# Patient Record
Sex: FEMALE | Race: BLACK OR AFRICAN AMERICAN | URBAN - METROPOLITAN AREA
[De-identification: names, ages, dates, MRNs, and addresses within clinical notes are randomized per-mention and may not be internally consistent; named-entity substitution may affect disease eponyms.]

---

## 2017-08-05 ENCOUNTER — OUTPATIENT (OUTPATIENT)
Dept: EMERGENCY DEPT | Facility: HOSPITAL | Age: 37
LOS: 1 days | Discharge: HOME | End: 2017-08-05
Payer: COMMERCIAL

## 2017-08-05 ENCOUNTER — EMERGENCY (EMERGENCY)
Facility: HOSPITAL | Age: 37
LOS: 1 days | Discharge: OTHER INSTITUTION | End: 2017-08-05
Attending: EMERGENCY MEDICINE | Admitting: EMERGENCY MEDICINE
Payer: COMMERCIAL

## 2017-08-05 VITALS
DIASTOLIC BLOOD PRESSURE: 66 MMHG | WEIGHT: 177.91 LBS | HEIGHT: 66 IN | HEART RATE: 89 BPM | SYSTOLIC BLOOD PRESSURE: 119 MMHG | RESPIRATION RATE: 16 BRPM | TEMPERATURE: 98 F | OXYGEN SATURATION: 97 %

## 2017-08-05 VITALS
TEMPERATURE: 98 F | OXYGEN SATURATION: 99 % | SYSTOLIC BLOOD PRESSURE: 113 MMHG | DIASTOLIC BLOOD PRESSURE: 80 MMHG | HEART RATE: 88 BPM | RESPIRATION RATE: 18 BRPM

## 2017-08-05 VITALS — HEART RATE: 79 BPM | TEMPERATURE: 98 F | DIASTOLIC BLOOD PRESSURE: 75 MMHG | SYSTOLIC BLOOD PRESSURE: 121 MMHG

## 2017-08-05 VITALS
TEMPERATURE: 97 F | OXYGEN SATURATION: 98 % | DIASTOLIC BLOOD PRESSURE: 75 MMHG | SYSTOLIC BLOOD PRESSURE: 113 MMHG | HEART RATE: 89 BPM | RESPIRATION RATE: 18 BRPM

## 2017-08-05 DIAGNOSIS — R55 SYNCOPE AND COLLAPSE: ICD-10-CM

## 2017-08-05 DIAGNOSIS — Z98.890 OTHER SPECIFIED POSTPROCEDURAL STATES: Chronic | ICD-10-CM

## 2017-08-05 DIAGNOSIS — S82.851A DISPLACED TRIMALLEOLAR FRACTURE OF RIGHT LOWER LEG, INITIAL ENCOUNTER FOR CLOSED FRACTURE: ICD-10-CM

## 2017-08-05 DIAGNOSIS — Z79.899 OTHER LONG TERM (CURRENT) DRUG THERAPY: ICD-10-CM

## 2017-08-05 DIAGNOSIS — Z3A.34 34 WEEKS GESTATION OF PREGNANCY: ICD-10-CM

## 2017-08-05 DIAGNOSIS — O9A.213 INJURY, POISONING AND CERTAIN OTHER CONSEQUENCES OF EXTERNAL CAUSES COMPLICATING PREGNANCY, THIRD TRIMESTER: ICD-10-CM

## 2017-08-05 LAB
ALBUMIN SERPL ELPH-MCNC: 2.4 G/DL — LOW (ref 3.4–5)
ALP SERPL-CCNC: 139 U/L — HIGH (ref 40–120)
ALT FLD-CCNC: 14 U/L — SIGNIFICANT CHANGE UP (ref 12–42)
ANION GAP SERPL CALC-SCNC: 10 MMOL/L — SIGNIFICANT CHANGE UP (ref 9–16)
APTT BLD: 32 SEC — SIGNIFICANT CHANGE UP (ref 27.5–36.5)
AST SERPL-CCNC: 14 U/L — LOW (ref 15–37)
BILIRUB SERPL-MCNC: 0.1 MG/DL — LOW (ref 0.2–1.2)
BUN SERPL-MCNC: 10 MG/DL — SIGNIFICANT CHANGE UP (ref 7–23)
CALCIUM SERPL-MCNC: 9.6 MG/DL — SIGNIFICANT CHANGE UP (ref 8.5–10.5)
CHLORIDE SERPL-SCNC: 103 MMOL/L — SIGNIFICANT CHANGE UP (ref 96–108)
CO2 SERPL-SCNC: 23 MMOL/L — SIGNIFICANT CHANGE UP (ref 22–31)
CREAT SERPL-MCNC: 0.68 MG/DL — SIGNIFICANT CHANGE UP (ref 0.5–1.3)
GLUCOSE SERPL-MCNC: 107 MG/DL — HIGH (ref 70–99)
HCT VFR BLD CALC: 34.6 % — SIGNIFICANT CHANGE UP (ref 34.5–45)
HGB BLD-MCNC: 11.4 G/DL — LOW (ref 11.5–15.5)
INR BLD: 0.99 — SIGNIFICANT CHANGE UP (ref 0.88–1.16)
MCHC RBC-ENTMCNC: 27.9 PG — SIGNIFICANT CHANGE UP (ref 27–34)
MCHC RBC-ENTMCNC: 32.9 G/DL — SIGNIFICANT CHANGE UP (ref 32–36)
MCV RBC AUTO: 84.6 FL — SIGNIFICANT CHANGE UP (ref 80–100)
PLATELET # BLD AUTO: 450 K/UL — HIGH (ref 150–400)
POTASSIUM SERPL-MCNC: 3.6 MMOL/L — SIGNIFICANT CHANGE UP (ref 3.5–5.3)
POTASSIUM SERPL-SCNC: 3.6 MMOL/L — SIGNIFICANT CHANGE UP (ref 3.5–5.3)
PROT SERPL-MCNC: 7.2 G/DL — SIGNIFICANT CHANGE UP (ref 6.4–8.2)
PROTHROM AB SERPL-ACNC: 10.9 SEC — SIGNIFICANT CHANGE UP (ref 9.8–12.7)
RBC # BLD: 4.09 M/UL — SIGNIFICANT CHANGE UP (ref 3.8–5.2)
RBC # FLD: 12.7 % — SIGNIFICANT CHANGE UP (ref 10.3–16.9)
SODIUM SERPL-SCNC: 136 MMOL/L — SIGNIFICANT CHANGE UP (ref 132–145)
WBC # BLD: 13.4 K/UL — HIGH (ref 3.8–10.5)
WBC # FLD AUTO: 13.4 K/UL — HIGH (ref 3.8–10.5)

## 2017-08-05 PROCEDURE — 73610 X-RAY EXAM OF ANKLE: CPT | Mod: 26,RT

## 2017-08-05 PROCEDURE — 99285 EMERGENCY DEPT VISIT HI MDM: CPT | Mod: 25

## 2017-08-05 PROCEDURE — 93010 ELECTROCARDIOGRAM REPORT: CPT | Mod: 59

## 2017-08-05 PROCEDURE — 99285 EMERGENCY DEPT VISIT HI MDM: CPT | Mod: 25,GC

## 2017-08-05 PROCEDURE — 73610 X-RAY EXAM OF ANKLE: CPT | Mod: 26,77,RT

## 2017-08-05 PROCEDURE — 73600 X-RAY EXAM OF ANKLE: CPT | Mod: 26,59,RT

## 2017-08-05 PROCEDURE — 27818 TREATMENT OF ANKLE FRACTURE: CPT | Mod: RT

## 2017-08-05 PROCEDURE — 29515 APPLICATION SHORT LEG SPLINT: CPT

## 2017-08-05 PROCEDURE — 73610 X-RAY EXAM OF ANKLE: CPT

## 2017-08-05 RX ORDER — SODIUM CHLORIDE 9 MG/ML
1000 INJECTION INTRAMUSCULAR; INTRAVENOUS; SUBCUTANEOUS ONCE
Qty: 0 | Refills: 0 | Status: COMPLETED | OUTPATIENT
Start: 2017-08-05 | End: 2017-08-05

## 2017-08-05 RX ORDER — HYDROMORPHONE HYDROCHLORIDE 2 MG/ML
0.5 INJECTION INTRAMUSCULAR; INTRAVENOUS; SUBCUTANEOUS ONCE
Qty: 0 | Refills: 0 | Status: DISCONTINUED | OUTPATIENT
Start: 2017-08-05 | End: 2017-08-05

## 2017-08-05 RX ADMIN — HYDROMORPHONE HYDROCHLORIDE 0.5 MILLIGRAM(S): 2 INJECTION INTRAMUSCULAR; INTRAVENOUS; SUBCUTANEOUS at 16:19

## 2017-08-05 RX ADMIN — HYDROMORPHONE HYDROCHLORIDE 0.5 MILLIGRAM(S): 2 INJECTION INTRAMUSCULAR; INTRAVENOUS; SUBCUTANEOUS at 18:37

## 2017-08-05 RX ADMIN — HYDROMORPHONE HYDROCHLORIDE 0.5 MILLIGRAM(S): 2 INJECTION INTRAMUSCULAR; INTRAVENOUS; SUBCUTANEOUS at 16:46

## 2017-08-05 RX ADMIN — SODIUM CHLORIDE 2000 MILLILITER(S): 9 INJECTION INTRAMUSCULAR; INTRAVENOUS; SUBCUTANEOUS at 16:19

## 2017-08-05 NOTE — ED ADULT TRIAGE NOTE - CHIEF COMPLAINT QUOTE
"I passed out today and hurt my right ankle". Pt was transferred from [facility] 34wks pregnant, , had .5mg of Dilaudid x2, ultrasound to check baby, + fracture and was splinted.

## 2017-08-05 NOTE — ED PROVIDER NOTE - OBJECTIVE STATEMENT
36 y/o patient who is 34 weeks pregnancy ( EDC , PMD @ [facility] in [place] [[place]]) was walking down the street and started to feel lightheaded and  sweaty and then the next thing she knew she was almost passing out and fell twisting her RIGHT ankle resulting in immediate pain and deformity.  Patient doesn't think that she actually passed all the way out and did not injury any other part of her body in the fall.  No abdominal pain/tenderness, no abdominal trauma.  NO VB/loss of fluids.  Patient arrives in ED awake, alert, oriented x 3, conversant and in no distress.  Still feeling baby move.  Pain and deformity in ankle but no open wounds.  No numbness, tingling, or loss of function. 38 y/o patient who is 34 weeks pregnancy ( EDC , PMD @ [facility] in [place] [[place]]) was walking down the street and started to feel lightheaded and  sweaty and then the next thing she knew she was almost passing out and fell twisting her RIGHT ankle resulting in immediate pain and deformity.  Patient doesn't think that she actually passed all the way out and did not injury any other part of her body in the fall.  No abdominal pain/tenderness, no abdominal trauma.  NO VB/loss of fluids.  Patient arrives in ED awake, alert, oriented x 3, conversant and in no distress.  Still feeling baby move.  Pain and deformity in ankle but no open wounds.  No numbness, tingling, or loss of function.  No balance problems.  No facial droop.  No focal weakness or numbness.  No chest pain, dyspnea, cough, or leg pain/swelling.  Has been getting PNC with normal pregnancy thus far.

## 2017-08-05 NOTE — ED PROVIDER NOTE - CARE PLAN
Principal Discharge DX:	Ankle fracture, right, closed, initial encounter  Secondary Diagnosis:	34 weeks gestation of pregnancy

## 2017-08-05 NOTE — ED PROVIDER NOTE - PROGRESS NOTE DETAILS
Risks/benefits/alternatives to use of narcotic pain medications in pregnancy discussed.  Patient voices understanding of risks and agrees to use of dilaudid for pain. Case d/w Dr. Tomi Crowder, orthopedics.  Xrays reviewed.  He states patient can be splinted and should  be sent ER-ER to see the ortho residents with Dr. Johnson as attending.  Surgery will eventually be needed after delivery.  Case d/w OB resident Dr. Valera who accepts patient as transfer to L&D for NST, etc.  Patient feels well - no abdominal complaints. Posterior/stirrup splint placed on RIGHT ankle.  Normal pulses, movement, sensation, strength, cap refill before and after splinting.  Case d/w ER MD IRASEMA Neal. Director at [facility] who accepts patient as ER-ER transfer.  Per Dr. Crowder, ortho residents have been notified and will see patient in [facility] ED and then patient will go to L&D for NST/etc.  Patient understands plan.  Post-splinting xrays ordered. Bedside ultrasound reveals good fetal movement and good FHR.

## 2017-08-05 NOTE — ED PROVIDER NOTE - MEDICAL DECISION MAKING DETAILS
36 yo 34 weeks  who presents with tri-malleolar fracture s/p presyncope and fall. Reduced and splinted by orthopedics in the ED. Post-reduction Xray ok. No abdominal trauma. US of abdomen and fetal heart tones reassuring. Will admit to OB for baby check and monitoring.

## 2017-08-05 NOTE — ED ADULT NURSE NOTE - CHIEF COMPLAINT QUOTE
syncopal episode after feeling hot and dizzy resulting in a right ankle injury +deformity to ankle. pt is also 7 months pregnant. denies abdominal pain

## 2017-08-05 NOTE — ED PROVIDER NOTE - PHYSICAL EXAMINATION
Constitutional: Well-appearing. Well-developed. No acute distress.  Head: Normocephalic, atraumatic.  ENMT: Eyes clear, sclera non-injected, conjunctiva non-pallorous. Mucus membranes moist.  Respiratory: Respirations non-labored, symmetric chest expansion, lungs clear to auscultation bilaterally.  Cardiac: RRR, S1/S2 present, no S3/S4,. no murmur, gallop or rub.  Vasc: Warm, well-perfused. 2+ radial, dorsalis pedis and posterior tibial pulses. No lower extremity edema.  Abd: pregnant abdomen. Soft, non-tender. Normoactive bowel sounds.   MSK: exquisitely tender R ankle with swelling  Skin: Color appropriate for stated race. No rash or lesions.  Neuro: Awake, alert and oriented to person, place, year and president. Speech fluent with normal fund of knowledge. PERRL, EOMI.  No focal motor or sensory deficits.  Psych: Normal mood. Normal affect.

## 2017-08-05 NOTE — ED PROVIDER NOTE - ATTENDING CONTRIBUTION TO CARE
Agree with documentation provided by resident -- fx stabilized and casted by ortho, admitted to L&D for tocometry.

## 2017-08-05 NOTE — ED PROVIDER NOTE - PHYSICAL EXAMINATION
RIGHT ankle swollen and painful.  Tender diffusely.  Pulses, movement, sensation, strength all normal.  Good cap refill.  Good movement.  Good sensation.  Normal color.  No proximal pain/tenderness or injury. Homans' negative.

## 2017-08-05 NOTE — PROCEDURE NOTE - NSPOSTCAREGUIDE_GEN_A_CORE
Keep the cast/splint/dressing clean and dry/Verbal/written post procedure instructions were given to patient/caregiver/Elevate the injured extremity as instructed

## 2017-08-05 NOTE — CONSULT NOTE ADULT - SUBJECTIVE AND OBJECTIVE BOX
37F no PMH, 34 weeks pregnant, presenting to [facility] w/ R ankle pain s/p syncopal episode. Pt. said she was walking w/ her boyfriend when she felt lightheaded and eventually passed out. She woke up and had R ankle pain and noticed a deformity. EMS splinted and manipulated ankle en route to [facility]. XR at [facility] showed a R trimal fx w/ a reduced mortise.  Pt. transferred to [facility] for admission to OB service. Ortho consulted for further fx management.    Vital Signs Last 24 Hrs  T(C): 36.7 (05 Aug 2017 19:29), Max: 36.8 (05 Aug 2017 18:37)  T(F): 98 (05 Aug 2017 19:29), Max: 98.2 (05 Aug 2017 18:37)  HR: 89 (05 Aug 2017 19:29) (79 - 89)  BP: 119/66 (05 Aug 2017 19:29) (113/80 - 121/75)  BP(mean): --  RR: 16 (05 Aug 2017 19:29) (16 - 18)  SpO2: 97% (05 Aug 2017 19:29) (97% - 99%)    PE:  NAD, laying comfortably in stretcher  R ankle: +deformity noted, c/w ankle dislocation on post-splint XR from [facility], minimal swelling, no skin tenting, diffusely TTP  Motor: +wiggled toes, unable to ROM ankle 2/2 pain  Sensory: SILT  Pulses: wwp, DP/PT 2+, CR <2s    A/P: 37F 34 weeks pregnant w/ R trimal fx    -Transferred from [facility] to OB service for further management  -R ankle closed reduced and splinted in ED  -NWB RLE  -Pain control  -management per primary team  -Can f/u with Dr. Johnson upon discharge  -Dr. Johnson aware 37F no PMH, 34 weeks pregnant, presenting to [facility] w/ R ankle pain s/p syncopal episode. Pt. said she was walking w/ her boyfriend when she felt lightheaded and eventually passed out. She woke up and had R ankle pain and noticed a deformity. EMS splinted and manipulated ankle en route to [facility]. XR at [facility] showed a R trimal fx w/ a reduced mortise.  Pt. transferred to [facility] for admission to OB service. Ortho consulted for further fx management.    Vital Signs Last 24 Hrs  T(C): 36.7 (05 Aug 2017 19:29), Max: 36.8 (05 Aug 2017 18:37)  T(F): 98 (05 Aug 2017 19:29), Max: 98.2 (05 Aug 2017 18:37)  HR: 89 (05 Aug 2017 19:29) (79 - 89)  BP: 119/66 (05 Aug 2017 19:29) (113/80 - 121/75)  BP(mean): --  RR: 16 (05 Aug 2017 19:29) (16 - 18)  SpO2: 97% (05 Aug 2017 19:29) (97% - 99%)    PE:  NAD, laying comfortably in stretcher  R ankle: +deformity noted, c/w ankle dislocation on post-splint XR from [facility], minimal swelling, no skin tenting, diffusely TTP  Motor: +wiggled toes, unable to ROM ankle 2/2 pain  Sensory: SILT  Pulses: wwp, DP/PT 2+, CR <2s    A/P: 37F 34 weeks pregnant w/ R trimal fx    -Transferred from [facility] to OB service for further management  -R ankle closed reduced and splinted in ED  -NWB RLE  -Elevation, Pain control  -management per primary team  -Consider ORIF as outpatient w/ OB clearance and monitoring in 10-14 days to get closer to term  -OK to DC home once cleared by OB, could benefit from a wheelchair upon dc  -Can f/u with Dr. Johnson upon discharge  -Case discussed with Dr. Johnson 37F no PMH, 34 weeks pregnant, presenting to [facility] w/ R ankle pain s/p syncopal episode. Pt. said she was walking w/ her boyfriend when she felt lightheaded and eventually passed out. She woke up and had R ankle pain and noticed a deformity. EMS splinted and manipulated ankle en route to [facility]. XR at [facility] showed a R trimal fx w/ a reduced mortise.  Pt. transferred to [facility] for admission to OB service. Ortho consulted for further fx management.    Vital Signs Last 24 Hrs  T(C): 36.7 (05 Aug 2017 19:29), Max: 36.8 (05 Aug 2017 18:37)  T(F): 98 (05 Aug 2017 19:29), Max: 98.2 (05 Aug 2017 18:37)  HR: 89 (05 Aug 2017 19:29) (79 - 89)  BP: 119/66 (05 Aug 2017 19:29) (113/80 - 121/75)  BP(mean): --  RR: 16 (05 Aug 2017 19:29) (16 - 18)  SpO2: 97% (05 Aug 2017 19:29) (97% - 99%)    PE:  NAD, laying comfortably in stretcher  R ankle: +deformity noted, c/w ankle dislocation on post-splint XR from [facility], minimal swelling, no skin tenting, diffusely TTP  Motor: +wiggled toes, unable to ROM ankle 2/2 pain  Sensory: SILT  Pulses: wwp, DP/PT 2+, CR <2s    A/P: 37F 34 weeks pregnant w/ R trimal fx    -Transferred from [facility] to OB service for further management  -R ankle closed reduced and splinted in ED  -NWB RLE  -Elevation, Pain control  -management per primary team  -Consider ORIF as outpatient w/ OB clearance and monitoring in 10-14 days to get closer to term  -OK to DC home once cleared by OB, could benefit from a wheelchair upon dc  -Can f/u with Dr. Johnson upon discharge ([phone])  -Case discussed with Dr. Johnson

## 2017-08-05 NOTE — ED PROVIDER NOTE - OBJECTIVE STATEMENT
36 y/o patient who is 34 weeks pregnancy ( EDC ) transferred to [facility] ED from [facility] after evaluation of syncope and fall in which pt became lightheaded while walking, tripped and fell, twisting R ankle sustaining a tri-mal fx to the R ankle, splinted at [facility]. 36 y/o patient who is 34 weeks pregnancy ( EDC ) transferred to [facility] ED from [facility] after evaluation of pre-syncope and fall in which pt became lightheaded while walking, twisting R ankle sustaining a tri-mal fx to the R ankle, splinted at [facility]. Denies numbness/tingling of ext. Did not hit head, no LOC. Denies abd trauma. No abd pain, feels strong fetal movement both after impact and at time of interview. No lab abnormalities elicited on bloodwork at [facility], sent for ortho and obgyn eval. VSS at [facility] and en route to [facility].

## 2017-08-05 NOTE — ED ADULT TRIAGE NOTE - CHIEF COMPLAINT QUOTE
syncopal episode after feeling hot and dizzy resulting in a right ankle injury +deformity to ankle. pt is also 7 months pregnant syncopal episode after feeling hot and dizzy resulting in a right ankle injury +deformity to ankle. pt is also 7 months pregnant. denies abdominal pain

## 2017-08-05 NOTE — ED ADULT NURSE REASSESSMENT NOTE - NS ED NURSE REASSESS COMMENT FT1
pt aaox3, resting in stretcher. Pt states she has no complaints at this time. +fetal movement. Ortho at bedside. Will continue to monitor.
Pt splint on right leg assessed by orthopedic MD, Lidocaine 1% pulled for ankle stabilization.

## 2017-08-05 NOTE — ED ADULT NURSE NOTE - CHIEF COMPLAINT QUOTE
"I passed out today and hurt my right ankle". Pt was transferred from [facility] 34wks pregnant, , had .5mg of Dilaudid x2, ultrasound to check baby, + fracture and was splinted.

## 2017-08-05 NOTE — ED PROCEDURE NOTE - NS ED PERI VASCULAR NEG
no cyanosis of extremity/no swelling/capillary refill time < 2 seconds/fingers/toes warm to touch/no paresthesia

## 2017-08-05 NOTE — ED PROVIDER NOTE - MEDICAL DECISION MAKING DETAILS
Patient with near-syncope, twist and closed fracture of RIGHT ankle.  No abdominal injury or complaints.  Will obtain xray, check FHR/movement, labs, fluids, likely transfer [facility] for ortho/L&D

## 2017-08-05 NOTE — ED PROVIDER NOTE - CARE PLAN
Principal Discharge DX:	Fracture of malleolus, right, closed, initial encounter  Secondary Diagnosis:	Fall, initial encounter

## 2017-08-09 DIAGNOSIS — O26.899 OTHER SPECIFIED PREGNANCY RELATED CONDITIONS, UNSPECIFIED TRIMESTER: ICD-10-CM

## 2017-08-09 DIAGNOSIS — Z3A.00 WEEKS OF GESTATION OF PREGNANCY NOT SPECIFIED: ICD-10-CM

## 2021-03-15 ENCOUNTER — OFFICE VISIT (OUTPATIENT)
Dept: OBGYN CLINIC | Facility: CLINIC | Age: 41
End: 2021-03-15
Payer: OTHER MISCELLANEOUS

## 2021-03-15 VITALS
HEART RATE: 74 BPM | DIASTOLIC BLOOD PRESSURE: 72 MMHG | SYSTOLIC BLOOD PRESSURE: 113 MMHG | WEIGHT: 142 LBS | BODY MASS INDEX: 22.82 KG/M2 | HEIGHT: 66 IN

## 2021-03-15 DIAGNOSIS — M54.50 ACUTE BILATERAL LOW BACK PAIN WITHOUT SCIATICA: Primary | ICD-10-CM

## 2021-03-15 PROCEDURE — 99204 OFFICE O/P NEW MOD 45 MIN: CPT | Performed by: PHYSICAL MEDICINE & REHABILITATION

## 2021-03-15 RX ORDER — LIDOCAINE 50 MG/G
1 PATCH TOPICAL DAILY
Qty: 30 PATCH | Refills: 0 | Status: SHIPPED | OUTPATIENT
Start: 2021-03-15 | End: 2021-04-09

## 2021-03-15 RX ORDER — MELOXICAM 7.5 MG/1
7.5 TABLET ORAL DAILY PRN
Qty: 60 TABLET | Refills: 0 | Status: SHIPPED | OUTPATIENT
Start: 2021-03-15 | End: 2021-06-07

## 2021-03-15 NOTE — PATIENT INSTRUCTIONS
Warm soaks with epsom salt can also help with muscle tightness/cramping  Epsom salt releases magnesium which can be helpful  Over-the-counter salonpas patches can be applied to the area of discomfort to help with pain  There are two types of patches; one contains lidocaine 4% and the other contains menthol, camphor and methyl salicylate  You can try one or the other and see which one works best for you  You will be starting physical therapy  It is important to do home exercises as given by your physical therapist as you go through PT to speed up your recovery  Physical therapy addresses the problems that are causing your pain, instead of covering them up, as some other treatment options do  We will see you back after completing physical therapy

## 2021-03-15 NOTE — LETTER
To Whom It May Concern,    Luis Whitten is under my professional care  She was seen in my office on March 15, 2021  She is cleared to return to work with the following restrictions:     No lifting/pushing/pulling/carrying more than 10 lbs  Please excuse Luis Whitten from any work missed on this appointment date  If you have any questions or concerns, please do not hesitate to call          Sincerely,          Guillermo Montano, DO

## 2021-03-15 NOTE — PROGRESS NOTES
1  Acute bilateral low back pain without sciatica  Ambulatory referral to Physical Therapy    meloxicam (MOBIC) 7 5 mg tablet    lidocaine (LIDODERM) 5 %     Orders Placed This Encounter   Procedures    Ambulatory referral to Physical Therapy        Impression:  The patient's pain is multifactorial and is predominantly due to myofascial spasticity/strain and postural/core instability  There are no concerning neurological deficits  We discussed different treatment options and decided to proceed with meloxicam and lidocaine patches  We discussed the risk of NSAID use including internal bleeding, increased blood pressure, increased risk of heart attack/stroke and worsening kidney function  Tylenol (acetaminophen) can be used with one of the NSAIDs or by itself, as long as no new liver problems and not drinking alcohol  The patient should start physical therapy as soon as possible  I will see them back after 3 weeks of physical therapy or earlier if symptoms worsen/change  Patient works for [employer] and she will return to work with a weight restriction  Return in about 3 weeks (around 4/5/2021)  or earlier if symptoms worsen/change  HPI:  Jose House is a 47 y o  female  who presents for evaluation of   Chief Complaint   Patient presents with    Lower Back - Pain       Onset/Mechanism: 1/29/2021- she was working as a  and she developed pain in the middle of her low back  She continued to work and the pain spread laterally and up her spine  [facility] and was told she pulled a muscle in her spine  She was given steroids which helped with her pain but caused her to pee a lot  Location: As above  Radiation: As above  Quality: Aching  Provocative: Standing for too long  Severity: Depends on what she is doing  Associated Symptoms: As above  Also reports shooting numbness in the lower extremities    Treatment so far: Job modifications, steroids, [facility] and was given shots in the back  Symptoms are worse after all of this  Review of Systems   Constitutional: Positive for activity change  Negative for fever  HENT: Negative for trouble swallowing  Eyes: Negative for visual disturbance  Respiratory: Negative for shortness of breath  Cardiovascular: Negative for chest pain  Gastrointestinal: Negative for abdominal pain  Denies bowel incontinence  Endocrine: Negative for polydipsia  Genitourinary:        Denies urinary incontinence  Musculoskeletal: Positive for back pain  Negative for gait problem  Skin: Negative for rash  Allergic/Immunologic: Negative for immunocompromised state  Neurological: Negative for weakness and numbness  Denies saddle anesthesia  Hematological: Does not bruise/bleed easily  Psychiatric/Behavioral: Negative for confusion  No red flag symptoms including fever/chills, unintentional weight change, bowel/bladder incontinence, scissoring gait, personal/family history of cancer, pain worse at night, intravenous drug abuse or trauma  Following history reviewed and updated:  History reviewed  No pertinent past medical history  History reviewed  No pertinent surgical history  Social History   Social History     Substance and Sexual Activity   Alcohol Use None     Social History     Substance and Sexual Activity   Drug Use Not on file     Social History     Tobacco Use   Smoking Status Never Smoker   Smokeless Tobacco Never Used     History reviewed  No pertinent family history  No Known Allergies     Constitutional:  /72   Pulse 74   Ht 5' 6" (1 676 m)   Wt 64 4 kg (142 lb)   BMI 22 92 kg/m²    General: NAD  Eyes: Anicteric sclerae  Neck: Supple  Lungs: Unlabored breathing  Cardiovascular: No lower extremity edema  Skin: Intact without erythema  Neurologic: Sensation intact to light touch  Psychiatric: Mood and affect are appropriate      Orthopedic Examination   Inspection: No obvious deformities, lesions or rashes   ROM: Limited with flexion and extension due to spasticity and pain   Palpation: Tender along the bilateral lumbar paraspinals  There are no step offs   Neuro: Bilateral extremity strength is normal and symmetric  No muscle atrophy or abnormal tone  Bilateral extremity muscle stretch reflexes are physiologic and symmetric   No myelopathic signs  Sensation to light touch is intact throughout  Neural compression testing: Normal bilateral SLR/dural stretch  Equivocal Santillan's maneuver  Gait is normal     Procedures    Imaging Studies (I personally reviewed images in PACS and report if it was available):  Lumbar spine x-rays that are most recent to this encounter were reviewed  These images show a slight curvature of the spine  The L1 transverse processes are elongated and the left L1 transverse process makes contact with the 12th rib  There are no acute characteristics of this anomaly  The right L5 pedicle appears sclerotic on AP view  No acute osseous abnormalities  No severe degeneration

## 2021-04-09 ENCOUNTER — OFFICE VISIT (OUTPATIENT)
Dept: OBGYN CLINIC | Facility: CLINIC | Age: 41
End: 2021-04-09
Payer: OTHER MISCELLANEOUS

## 2021-04-09 VITALS
SYSTOLIC BLOOD PRESSURE: 110 MMHG | BODY MASS INDEX: 22.18 KG/M2 | WEIGHT: 138 LBS | HEIGHT: 66 IN | HEART RATE: 73 BPM | DIASTOLIC BLOOD PRESSURE: 76 MMHG

## 2021-04-09 DIAGNOSIS — M54.50 ACUTE BILATERAL LOW BACK PAIN WITHOUT SCIATICA: Primary | ICD-10-CM

## 2021-04-09 PROCEDURE — 99214 OFFICE O/P EST MOD 30 MIN: CPT | Performed by: PHYSICAL MEDICINE & REHABILITATION

## 2021-04-09 RX ORDER — METHOCARBAMOL 500 MG/1
500 TABLET, FILM COATED ORAL 2 TIMES DAILY PRN
Qty: 30 TABLET | Refills: 0 | Status: SHIPPED | OUTPATIENT
Start: 2021-04-09 | End: 2021-06-07

## 2021-04-09 RX ORDER — METHYLPREDNISOLONE 4 MG/1
TABLET ORAL
Qty: 1 EACH | Refills: 0 | Status: SHIPPED | OUTPATIENT
Start: 2021-04-09 | End: 2021-06-07

## 2021-04-09 NOTE — PROGRESS NOTES
1  Acute bilateral low back pain without sciatica  methylPREDNISolone 4 MG tablet therapy pack    methocarbamol (ROBAXIN) 500 mg tablet     No orders of the defined types were placed in this encounter  Impression:  The patient's pain is multifactorial and is predominantly due to myofascial spasticity/strain and postural/core instability  There are no concerning neurological deficits      We discussed different treatment options and decided to proceed with meloxicam and lidocaine patches on her initial visit along with physical therapy  Patient continues to have pain and reports no improvement in her symptoms  We will have her continue physical therapy  I have prescribed her Medrol dosepak along with methocarbamol  I will see her back in three weeks to reassess  If no improvement, can consider an MRI of her lumbar spine      I will see them back after 3 weeks of physical therapy or earlier if symptoms worsen/change      Patient works for [employer] and she will return to work with a weight restriction  Imaging Studies (I personally reviewed images in PACS and report):  Lumbar spine x-rays that are most recent to this encounter were reviewed  These images show a slight curvature of the spine  The L1 transverse processes are elongated and the left L1 transverse process makes contact with the 12th rib  There are no acute characteristics of this anomaly  The right L5 pedicle appears sclerotic on AP view  No acute osseous abnormalities  No severe degeneration  Return in about 3 weeks (around 4/30/2021)  HPI:  Charly Rebolledo is a 47 y o  female  who presents in follow up  Here for   Chief Complaint   Patient presents with    Lower Back - Follow-up, Pain       Date of injury: 1/29/2021- she was working as a  and she developed pain in the middle of her low back  She continued to work and the pain spread laterally and up her spine    [facility] and was told she pulled a muscle in her spine  She was given steroids which helped with her pain but caused her to pee a lot  Trajectory of symptoms: No improvement since last visit  Review of Systems   Constitutional: Positive for activity change  Negative for fever  HENT: Negative for trouble swallowing  Eyes: Negative for visual disturbance  Respiratory: Negative for shortness of breath  Cardiovascular: Negative for chest pain  Gastrointestinal: Negative for abdominal pain  Denies bowel incontinence  Endocrine: Negative for polydipsia  Genitourinary:        Denies urinary incontinence  Musculoskeletal: Positive for back pain  Negative for gait problem  Skin: Negative for rash  Allergic/Immunologic: Negative for immunocompromised state  Neurological: Negative for weakness and numbness  Denies saddle anesthesia  Hematological: Does not bruise/bleed easily  Psychiatric/Behavioral: Negative for confusion  Following history reviewed and updated:  History reviewed  No pertinent past medical history  History reviewed  No pertinent surgical history  Social History   Social History     Substance and Sexual Activity   Alcohol Use None     Social History     Substance and Sexual Activity   Drug Use Not on file     Social History     Tobacco Use   Smoking Status Never Smoker   Smokeless Tobacco Never Used     History reviewed  No pertinent family history  No Known Allergies     Constitutional:  /76   Pulse 73   Ht 5' 6" (1 676 m)   Wt 62 6 kg (138 lb)   BMI 22 27 kg/m²    General: NAD  Eyes: Clear sclerae  ENT: No inflammation, lesion, or mass of lips  No tracheal deviation  Musculoskeletal: As mentioned below  Integumentary: No visible rashes or skin lesions  Pulmonary/Chest: Effort normal  No respiratory distress  Neuro: CN's grossly intact, EUGENE  Psych: Normal affect and judgement  Vascular: WWP      Back Exam     Tenderness   The patient is experiencing tenderness in the thoracic and lumbar  Range of Motion   Extension: normal   Flexion: abnormal     Muscle Strength   The patient has normal back strength  Other   Sensation: normal  Gait: normal   Erythema: no back redness  Scars: absent    Comments:  No neurological deficits               Procedures

## 2021-04-09 NOTE — LETTER
To Whom It May Concern,    Charly Rebolledo is under my professional care  She was seen in my office on April 9, 2021  She is cleared to return to work with the following restrictions:    · No lifting/pushing/pulling/carrying more than 10 lbs  Please excuse Charly Rebolledo from any work missed on this appointment date  If you have any questions or concerns, please do not hesitate to call          Sincerely,          Guillermo oMntano, DO

## 2021-04-30 ENCOUNTER — OFFICE VISIT (OUTPATIENT)
Dept: OBGYN CLINIC | Facility: CLINIC | Age: 41
End: 2021-04-30
Payer: OTHER MISCELLANEOUS

## 2021-04-30 VITALS
DIASTOLIC BLOOD PRESSURE: 72 MMHG | HEIGHT: 66 IN | WEIGHT: 145 LBS | HEART RATE: 72 BPM | SYSTOLIC BLOOD PRESSURE: 113 MMHG | BODY MASS INDEX: 23.3 KG/M2

## 2021-04-30 DIAGNOSIS — G89.29 CHRONIC RIGHT-SIDED LOW BACK PAIN WITHOUT SCIATICA: Primary | ICD-10-CM

## 2021-04-30 DIAGNOSIS — S39.012D STRAIN OF LUMBAR REGION, SUBSEQUENT ENCOUNTER: ICD-10-CM

## 2021-04-30 DIAGNOSIS — M54.50 CHRONIC RIGHT-SIDED LOW BACK PAIN WITHOUT SCIATICA: Primary | ICD-10-CM

## 2021-04-30 PROCEDURE — 99213 OFFICE O/P EST LOW 20 MIN: CPT | Performed by: PHYSICAL MEDICINE & REHABILITATION

## 2021-04-30 NOTE — PROGRESS NOTES
1  Chronic right-sided low back pain without sciatica  MRI lumbar spine wo contrast   2  Strain of lumbar region, subsequent encounter       Orders Placed This Encounter   Procedures    MRI lumbar spine wo contrast        Impression:  The patient's pain is multifactorial and is predominantly due to myofascial spasticity/strain (quadratus lumborum) and postural/core instability   There are no concerning neurological deficits  Patient presents after going through many weeks of physical therapy  She has also been using meloxicam, lidocaine patches, over-the-counter anti-inflammatories, Tylenol, steroid burst and muscle relaxants which have not helped for more than a few days  At this point, an MRI of her lumbar spine is warranted  The patient works for [employer] and we will have her continue to work with a weight restriction  The patient's symptoms are quite flared up today and I provided her with a note that she will be off for tomorrow to allow her a chance to recover  She will start back up this upcoming Monday with a weight restriction  I will see her back after the MRI  Imaging Studies (I personally reviewed images in PACS and report):  Lumbar spine x-rays that are most recent to this encounter were reviewed  Virgil Bhagat images show a slight curvature of the spine   The L1 transverse processes are elongated and the left L1 transverse process makes contact with the 12th rib   There are no acute characteristics of this anomaly   The right L5 pedicle appears sclerotic on AP view   No acute osseous abnormalities   No severe degeneration  No follow-ups on file  HPI:  Magalie Soni is a 46 y o  female  who presents in follow up  Here for No chief complaint on file  Date of injury: 1/29/2021- she was working as a  and she developed pain in the middle of her low back   She continued to work and the pain spread laterally and up her spine   [facility] she [facility] a [facility] in her spine   She was given steroids which helped with her pain but caused her to pee a lot  Trajectory of symptoms: Does well right after PT but symptoms return soon after  Review of Systems   Constitutional: Positive for activity change  Negative for fever  HENT: Negative for trouble swallowing  Eyes: Negative for visual disturbance  Respiratory: Negative for shortness of breath  Cardiovascular: Negative for chest pain  Gastrointestinal: Negative for abdominal pain  Denies bowel incontinence  Endocrine: Negative for polydipsia  Genitourinary:        Denies urinary incontinence  Musculoskeletal: Positive for back pain  Negative for gait problem  Skin: Negative for rash  Allergic/Immunologic: Negative for immunocompromised state  Neurological: Negative for weakness and numbness  Denies saddle anesthesia  Hematological: Does not bruise/bleed easily  Psychiatric/Behavioral: Negative for confusion  Following history reviewed and updated:  History reviewed  No pertinent past medical history  History reviewed  No pertinent surgical history  Social History   Social History     Substance and Sexual Activity   Alcohol Use None     Social History     Substance and Sexual Activity   Drug Use Not on file     Social History     Tobacco Use   Smoking Status Never Smoker   Smokeless Tobacco Never Used     History reviewed  No pertinent family history  No Known Allergies     Constitutional:  /72   Pulse 72   Ht 5' 6" (1 676 m)   Wt 65 8 kg (145 lb)   BMI 23 40 kg/m²    General: NAD  Eyes: Clear sclerae  ENT: No inflammation, lesion, or mass of lips  No tracheal deviation  Musculoskeletal: As mentioned below  Integumentary: No visible rashes or skin lesions  Pulmonary/Chest: Effort normal  No respiratory distress  Neuro: CN's grossly intact, EUGENE  Psych: Normal affect and judgement  Vascular: WWP      Back Exam     Tenderness   Back tenderness location: TTP along the right QL region  Range of Motion   Extension: abnormal   Flexion: abnormal     Muscle Strength   The patient has normal back strength  Other   Sensation: normal  Gait: normal   Erythema: no back redness  Scars: absent    Comments:  Pain with QL stretches               Procedures

## 2021-04-30 NOTE — LETTER
To Whom It May Concern,    Amrita Childers is under my professional care  She was seen in my office on April 30, 2021  She is cleared to return to work with the following restrictions:     10 lbs weight limit   Excuse her for work missed on 5/1/2021  Please excuse Amrita Childers from any work missed on this appointment date  If you have any questions or concerns, please do not hesitate to call          Sincerely,          Guillermo Montano, DO 0 = independent

## 2021-05-27 ENCOUNTER — TELEPHONE (OUTPATIENT)
Dept: OBGYN CLINIC | Facility: CLINIC | Age: 41
End: 2021-05-27

## 2021-05-27 NOTE — TELEPHONE ENCOUNTER
[facility] called in requesting a new PT script         Please fax PT script to #[phone]

## 2021-06-01 PROBLEM — S39.012A STRAIN OF LUMBAR REGION: Status: ACTIVE | Noted: 2021-06-01

## 2021-06-08 ENCOUNTER — TELEPHONE (OUTPATIENT)
Dept: OBGYN CLINIC | Facility: HOSPITAL | Age: 41
End: 2021-06-08

## 2021-06-08 DIAGNOSIS — S39.012D STRAIN OF LUMBAR REGION, SUBSEQUENT ENCOUNTER: Primary | ICD-10-CM

## 2021-06-08 DIAGNOSIS — M54.50 CHRONIC RIGHT-SIDED LOW BACK PAIN WITHOUT SCIATICA: ICD-10-CM

## 2021-06-08 DIAGNOSIS — G89.29 CHRONIC RIGHT-SIDED LOW BACK PAIN WITHOUT SCIATICA: ICD-10-CM

## 2021-06-08 NOTE — TELEPHONE ENCOUNTER
Josephine from [facility] is calling for a new PT script  The script she was sent is from March and she needs it to be updated starting 6/1/21 if possible  If we could please get this faxed to [phone]  Any questions please contact her at [phone] option #4

## 2021-06-10 ENCOUNTER — TELEPHONE (OUTPATIENT)
Dept: OBGYN CLINIC | Facility: CLINIC | Age: 41
End: 2021-06-10

## 2021-06-11 ENCOUNTER — OFFICE VISIT (OUTPATIENT)
Dept: OBGYN CLINIC | Facility: CLINIC | Age: 41
End: 2021-06-11
Payer: OTHER MISCELLANEOUS

## 2021-06-11 VITALS
HEART RATE: 62 BPM | WEIGHT: 145 LBS | SYSTOLIC BLOOD PRESSURE: 109 MMHG | DIASTOLIC BLOOD PRESSURE: 73 MMHG | HEIGHT: 66 IN | BODY MASS INDEX: 23.3 KG/M2

## 2021-06-11 DIAGNOSIS — M54.50 ACUTE BILATERAL LOW BACK PAIN WITHOUT SCIATICA: ICD-10-CM

## 2021-06-11 DIAGNOSIS — S39.012D STRAIN OF LUMBAR REGION, SUBSEQUENT ENCOUNTER: Primary | ICD-10-CM

## 2021-06-11 PROCEDURE — 99213 OFFICE O/P EST LOW 20 MIN: CPT | Performed by: PHYSICAL MEDICINE & REHABILITATION

## 2021-06-11 NOTE — PROGRESS NOTES
1  Strain of lumbar region, subsequent encounter     2  Acute bilateral low back pain without sciatica       No orders of the defined types were placed in this encounter  Impression:  The patient's pain is multifactorial and is predominantly due to myofascial spasticity/strain (quadratus lumborum) and postural/core instability   There are no concerning neurological deficits      Patient presents after going through many weeks of physical therapy  She has also been using meloxicam, lidocaine patches, over-the-counter anti-inflammatories, Tylenol, steroid burst and muscle relaxants which have not helped for more than a few days  I had ordered an MRI of her lumbar spine on her last visit  Due to insurance/worker's compensation issues, this has not been completed yet      The patient works for [employer] and we will have her continue to work with a weight restriction  If her MRI is within normal limits, the goal will be to get her back to work without any restrictions  Continue with meloxicam   She has had no side effects  I will see her back after the MRI  Imaging Studies (I personally reviewed images in PACS and report):  Lumbar spine x-rays that are most recent to this encounter were reviewed  Hulan Aus images show a slight curvature of the spine   The L1 transverse processes are elongated and the left L1 transverse process makes contact with the 12th rib   There are no acute characteristics of this anomaly   The right L5 pedicle appears sclerotic on AP view   No acute osseous abnormalities   No severe degeneration  Return in about 4 weeks (around 7/9/2021)  HPI:  Lisa Ash is a 46 y o  female  who presents in follow up  Here for   Chief Complaint   Patient presents with    Lower Back - Pain, Spasms       Date of injury: 1/29/2021- she was working as a  and she developed pain in the middle of her low back  She continued to work and the pain spread laterally and up her spine   [facility] she [facility] a muscle in her spine   She was given steroids which helped with her pain but caused her to pee a lot  Trajectory of symptoms:  Patient reports that she has some swelling on the right side of her low back  This has improved after doing certain exercises with physical therapy  She continues to feel that her back is stiff and has trouble with extension  Flexion helps with her back pain  She continues to work with a weight limit restriction  Review of Systems   Constitutional: Positive for activity change  Negative for fever  HENT: Negative for trouble swallowing  Eyes: Negative for visual disturbance  Respiratory: Negative for shortness of breath  Cardiovascular: Negative for chest pain  Gastrointestinal: Negative for abdominal pain  Denies bowel incontinence  Endocrine: Negative for polydipsia  Genitourinary:        Denies urinary incontinence  Musculoskeletal: Positive for back pain  Negative for gait problem  Skin: Negative for rash  Allergic/Immunologic: Negative for immunocompromised state  Neurological: Negative for weakness and numbness  Denies saddle anesthesia  Hematological: Does not bruise/bleed easily  Psychiatric/Behavioral: Negative for confusion  Following history reviewed and updated:  History reviewed  No pertinent past medical history  History reviewed  No pertinent surgical history  Social History   Social History     Substance and Sexual Activity   Alcohol Use None     Social History     Substance and Sexual Activity   Drug Use Not on file     Social History     Tobacco Use   Smoking Status Never Smoker   Smokeless Tobacco Never Used     History reviewed  No pertinent family history  No Known Allergies     Constitutional:  /73   Pulse 62   Ht 5' 6" (1 676 m)   Wt 65 8 kg (145 lb)   BMI 23 40 kg/m²    General: NAD  Eyes: Clear sclerae    ENT: No inflammation, lesion, or mass of lips   No tracheal deviation  Musculoskeletal: As mentioned below  Integumentary: No visible rashes or skin lesions  Pulmonary/Chest: Effort normal  No respiratory distress  Neuro: CN's grossly intact, EUGENE  Psych: Normal affect and judgement  Vascular: WWP  Back Exam     Tenderness   The patient is experiencing tenderness in the lumbar and sacroiliac  Range of Motion   Extension: abnormal   Flexion: normal     Muscle Strength   The patient has normal back strength      Other   Sensation: normal  Gait: normal   Erythema: no back redness  Scars: absent             Procedures

## 2021-06-11 NOTE — LETTER
To Whom It May Concern,     Alli Filter is under my professional care    She was seen in my office on June 11, 2021        She is cleared to return to work with the following restrictions:     · 10 lbs weight limit      Please excuse Alli Filter from any work missed on this appointment date      If you have any questions or concerns, please do not hesitate to call            Sincerely,            Guillermo Phipps, DO

## 2021-07-09 ENCOUNTER — OFFICE VISIT (OUTPATIENT)
Dept: OBGYN CLINIC | Facility: CLINIC | Age: 41
End: 2021-07-09
Payer: OTHER MISCELLANEOUS

## 2021-07-09 VITALS — BODY MASS INDEX: 23.3 KG/M2 | WEIGHT: 145 LBS | HEIGHT: 66 IN

## 2021-07-09 DIAGNOSIS — M54.50 ACUTE BILATERAL LOW BACK PAIN WITHOUT SCIATICA: Primary | ICD-10-CM

## 2021-07-09 DIAGNOSIS — S39.012D STRAIN OF LUMBAR REGION, SUBSEQUENT ENCOUNTER: ICD-10-CM

## 2021-07-09 PROCEDURE — 99213 OFFICE O/P EST LOW 20 MIN: CPT | Performed by: PHYSICAL MEDICINE & REHABILITATION

## 2021-07-09 NOTE — LETTER
To Whom It May Concern,    Susi Finch is under my professional care  She was seen in my office on July 9, 2021  She is cleared to return to work with the following restrictions:     No lifting above 25 lbs  Please excuse Susi Finch from any work missed on this appointment date  If you have any questions or concerns, please do not hesitate to call          Sincerely,          Guillermo Montano, DO

## 2021-07-29 ENCOUNTER — OFFICE VISIT (OUTPATIENT)
Dept: OBGYN CLINIC | Facility: CLINIC | Age: 41
End: 2021-07-29
Payer: COMMERCIAL

## 2021-07-29 VITALS
WEIGHT: 133.4 LBS | HEIGHT: 66 IN | SYSTOLIC BLOOD PRESSURE: 110 MMHG | BODY MASS INDEX: 21.44 KG/M2 | DIASTOLIC BLOOD PRESSURE: 72 MMHG

## 2021-07-29 DIAGNOSIS — Z01.419 ENCOUNTER FOR GYNECOLOGICAL EXAMINATION WITHOUT ABNORMAL FINDING: Primary | ICD-10-CM

## 2021-07-29 DIAGNOSIS — Z12.31 ENCOUNTER FOR SCREENING MAMMOGRAM FOR BREAST CANCER: ICD-10-CM

## 2021-07-29 PROCEDURE — G0145 SCR C/V CYTO,THINLAYER,RESCR: HCPCS | Performed by: PHYSICIAN ASSISTANT

## 2021-07-29 PROCEDURE — G0476 HPV COMBO ASSAY CA SCREEN: HCPCS | Performed by: PHYSICIAN ASSISTANT

## 2021-07-29 PROCEDURE — 99385 PREV VISIT NEW AGE 18-39: CPT | Performed by: PHYSICIAN ASSISTANT

## 2021-07-29 NOTE — PROGRESS NOTES
Assessment/Plan:    No problem-specific Assessment & Plan notes found for this encounter  Diagnoses and all orders for this visit:    Encounter for gynecological examination without abnormal finding  -     Liquid-based pap, screening    Encounter for screening mammogram for breast cancer  -     Mammo screening bilateral w 3d & cad; Future        Pap done  Order for mammogram entered  Discussed options for birth control to decrease bleeding including OCPs, Nuva ring, Depo Provera injection, Nexplanon, and IUD  Counseled on possible bleeding patterns, risks vs benefits, and potential side effects  Patient will consider  If no problems, patient to return in 1 year for routine gyn care  Subjective:      Patient ID: David Fields is a 46 y o  female  Patient is a  here for yearly gyn exam   States she is doing well overall  Periods are regular once a month, and bleeding lasts for 7 days  States flow is heavier since her son was born 4 years ago  Denies severe cramping  Declines STD screening  Patient denies bowel/bladder changes, pelvic pain, bloating, abdominal pain, n/v, change in appetite, and thyroid disease  No history of abnormal Paps  Due for first mammogram May 2022  She is performing self-breast exam   Denies new masses, skin changes, nipple discharge, and pain/tenderness  Family history of breast cancer in her MGM in her 62s  The following portions of the patient's history were reviewed and updated as appropriate: allergies, current medications, past family history, past medical history, past social history, past surgical history and problem list     Review of Systems   Constitutional: Negative for appetite change and unexpected weight change  Cardiovascular:        No masses, skin changes, nipple discharge, and pain/tenderness  Gastrointestinal: Negative for abdominal distention, abdominal pain, constipation, diarrhea, nausea and vomiting     Genitourinary: Negative for difficulty urinating, dysuria, frequency, genital sores, hematuria, menstrual problem, pelvic pain, urgency, vaginal bleeding, vaginal discharge and vaginal pain  Objective:      /72 (BP Location: Left arm, Patient Position: Sitting, Cuff Size: Standard)   Ht 5' 6" (1 676 m)   Wt 60 5 kg (133 lb 6 4 oz)   LMP 07/20/2021   BMI 21 53 kg/m²          Physical Exam  Vitals reviewed  Exam conducted with a chaperone present  Constitutional:       Appearance: Normal appearance  She is well-developed and normal weight  Neck:      Thyroid: No thyromegaly  Pulmonary:      Effort: Pulmonary effort is normal    Chest:      Breasts: Breasts are symmetrical          Right: Normal  No swelling, bleeding, inverted nipple, mass, nipple discharge, skin change or tenderness  Left: Normal  No swelling, bleeding, inverted nipple, mass, nipple discharge, skin change or tenderness  Abdominal:      General: Abdomen is flat  There is no distension  Palpations: Abdomen is soft  Tenderness: There is no abdominal tenderness  Genitourinary:     General: Normal vulva  Pubic Area: No rash  Labia:         Right: No rash, tenderness, lesion or injury  Left: No rash, tenderness, lesion or injury  Vagina: Normal  No vaginal discharge, erythema, tenderness or bleeding  Cervix: Normal       Uterus: Normal        Adnexa: Right adnexa normal and left adnexa normal         Right: No mass, tenderness or fullness  Left: No mass, tenderness or fullness  Musculoskeletal:      Cervical back: Neck supple  Lymphadenopathy:      Cervical: No cervical adenopathy  Upper Body:      Right upper body: No supraclavicular or axillary adenopathy  Left upper body: No supraclavicular or axillary adenopathy  Lower Body: No right inguinal adenopathy  No left inguinal adenopathy  Skin:     General: Skin is warm and dry     Neurological:      Mental Status: She is alert and oriented to person, place, and time  Psychiatric:         Mood and Affect: Mood normal          Behavior: Behavior normal  Behavior is cooperative  Thought Content:  Thought content normal          Judgment: Judgment normal

## 2021-07-31 LAB
HPV HR 12 DNA CVX QL NAA+PROBE: NEGATIVE
HPV16 DNA CVX QL NAA+PROBE: NEGATIVE
HPV18 DNA CVX QL NAA+PROBE: NEGATIVE

## 2021-08-04 LAB
LAB AP GYN PRIMARY INTERPRETATION: NORMAL
Lab: NORMAL

## 2021-08-07 ENCOUNTER — HOSPITAL ENCOUNTER (OUTPATIENT)
Dept: MRI IMAGING | Facility: HOSPITAL | Age: 41
Discharge: HOME | End: 2021-08-07
Attending: PHYSICAL MEDICINE & REHABILITATION
Payer: OTHER MISCELLANEOUS

## 2021-08-07 DIAGNOSIS — S39.012D STRAIN OF LUMBAR REGION, SUBSEQUENT ENCOUNTER: ICD-10-CM

## 2021-08-07 DIAGNOSIS — M54.50 ACUTE BILATERAL LOW BACK PAIN WITHOUT SCIATICA: ICD-10-CM

## 2021-08-07 PROCEDURE — 72148 MRI LUMBAR SPINE W/O DYE: CPT

## 2021-08-07 PROCEDURE — G1004 CDSM NDSC: HCPCS

## 2021-08-11 ENCOUNTER — TELEPHONE (OUTPATIENT)
Dept: OBGYN CLINIC | Facility: HOSPITAL | Age: 41
End: 2021-08-11

## 2021-08-26 ENCOUNTER — OFFICE VISIT (OUTPATIENT)
Dept: OBGYN CLINIC | Facility: MEDICAL CENTER | Age: 41
End: 2021-08-26
Payer: OTHER MISCELLANEOUS

## 2021-08-26 VITALS
HEART RATE: 82 BPM | DIASTOLIC BLOOD PRESSURE: 68 MMHG | WEIGHT: 133 LBS | SYSTOLIC BLOOD PRESSURE: 102 MMHG | HEIGHT: 66 IN | BODY MASS INDEX: 21.38 KG/M2

## 2021-08-26 DIAGNOSIS — M54.50 CHRONIC BILATERAL LOW BACK PAIN WITHOUT SCIATICA: Primary | ICD-10-CM

## 2021-08-26 DIAGNOSIS — S39.012D STRAIN OF LUMBAR REGION, SUBSEQUENT ENCOUNTER: ICD-10-CM

## 2021-08-26 DIAGNOSIS — G89.29 CHRONIC BILATERAL LOW BACK PAIN WITHOUT SCIATICA: Primary | ICD-10-CM

## 2021-08-26 PROCEDURE — 99213 OFFICE O/P EST LOW 20 MIN: CPT | Performed by: PHYSICAL MEDICINE & REHABILITATION

## 2021-08-26 NOTE — PROGRESS NOTES
1  Chronic bilateral low back pain without sciatica     2  Strain of lumbar region, subsequent encounter       No orders of the defined types were placed in this encounter  Impression:  The patient's pain is multifactorial and is predominantly due to myofascial spasticity/strain (quadratus lumborum) and postural/core instability   There are no concerning neurological deficits      Patient presents after going through many weeks of physical therapy       Patient continues to improve slowly  She is no longer needing medications to control her pain and the physical therapy continues to help      The patient works for [employer] and we will have increase the weight limit to 50 lb from 25 lb  I will see her back in four weeks to reassess  Her symptoms should only improve from here on now and our plan will be to have her return with no restrictions if all continues to go well  Imaging Studies (I personally reviewed images in PACS and report):  Lumbar spine x-rays that are most recent to this encounter were reviewed  Elio Hawk images show a slight curvature of the spine   The L1 transverse processes are elongated and the left L1 transverse process makes contact with the 12th rib   There are no acute characteristics of this anomaly   The right L5 pedicle appears sclerotic on AP view   No acute osseous abnormalities   No severe degeneration  MRI of the lumbar spine dated 8/7/2021:   VERTEBRAL BODIES:  There are 5 lumbar type vertebral bodies  Normal alignment of the lumbar spine  No spondylolysis or spondylolisthesis  No scoliosis  No compression fracture  Normal marrow signal is identified within the visualized bony   structures  No discrete marrow lesion      SACRUM:  Normal signal within the sacrum   No evidence of insufficiency or stress fracture      DISTAL CORD AND CONUS:  Normal size and signal within the distal cord and conus      PARASPINAL SOFT TISSUES:  Paraspinal soft tissues are unremarkable      LOWER THORACIC DISC SPACES:  Normal disc height and signal   No disc herniation, canal stenosis or foraminal narrowing      LUMBAR DISC SPACES:     L1-L2:  Normal      L2-L3:  Normal      L3-L4:  Small broad-based left foraminal and extraforaminal disc protrusion without canal stenosis or discrete nerve impingement      L4-L5:  Normal disc height and signal   Minor facet degenerative change  No canal stenosis or foraminal narrowing      L5-S1:  Small left foraminal and extraforaminal disc protrusion with bilateral facet degenerative change  No canal stenosis  No foraminal nerve impingement      IMPRESSION:     Minor noncompressive lumbar degenerative change at the L3-4, L4-5 and L5-S1 levels "    Return in about 4 weeks (around 9/23/2021)  Patient is in agreement with the above plan  HPI:  Viviane Benson is a 46 y o  female  who presents in follow up  Here for   Chief Complaint   Patient presents with    Spine - Follow-up       Date of injury: 1/29/2021- she was working as a  and she developed pain in the middle of her low back  She continued to work and the pain spread laterally and up her spine   [facility] she [facility] a [facility] in her spine   She was given steroids which helped with her pain but caused her to pee a lot  Trajectory of symptoms:  Slowly improving  Review of Systems   Constitutional: Positive for activity change  Negative for fever  HENT: Negative for trouble swallowing  Eyes: Negative for visual disturbance  Respiratory: Negative for shortness of breath  Cardiovascular: Negative for chest pain  Gastrointestinal: Negative for abdominal pain  Denies bowel incontinence  Endocrine: Negative for polydipsia  Genitourinary:        Denies urinary incontinence  Musculoskeletal: Positive for back pain  Negative for gait problem  Skin: Negative for rash     Allergic/Immunologic: Negative for immunocompromised state    Neurological: Negative for weakness and numbness  Denies saddle anesthesia  Hematological: Does not bruise/bleed easily  Psychiatric/Behavioral: Negative for confusion  Following history reviewed and updated:  Past Medical History:   Diagnosis Date    Fibroid      Past Surgical History:   Procedure Laterality Date     SECTION      HAND SURGERY       Social History   Social History     Substance and Sexual Activity   Alcohol Use Yes    Comment: socailly      Social History     Substance and Sexual Activity   Drug Use Never     Social History     Tobacco Use   Smoking Status Never Smoker   Smokeless Tobacco Never Used     Family History   Problem Relation Age of Onset    Fibroids Sister      No Known Allergies     Constitutional:  /68 (BP Location: Right arm, Patient Position: Sitting, Cuff Size: Standard)   Pulse 82   Ht 5' 6" (1 676 m)   Wt 60 3 kg (133 lb)   BMI 21 47 kg/m²    General: NAD  Eyes: Clear sclerae  ENT: No inflammation, lesion, or mass of lips  No tracheal deviation  Musculoskeletal: As mentioned below  Integumentary: No visible rashes or skin lesions  Pulmonary/Chest: Effort normal  No respiratory distress  Neuro: CN's grossly intact, EUGENE  Psych: Normal affect and judgement  Vascular: WWP  Back Exam     Tenderness   The patient is experiencing no tenderness  Range of Motion   The patient has normal back ROM  Muscle Strength   The patient has normal back strength      Other   Sensation: normal  Gait: normal   Erythema: no back redness  Scars: absent             Procedures

## 2021-08-26 NOTE — LETTER
To Whom It May Concern,    Lillie Arnold is under my professional care  She was seen in my office on August 26, 2021  She is cleared to return to work with the following restrictions:     No lifting above 50 lbs  Please excuse Lillie Arnold from any work missed on this appointment date  If you have any questions or concerns, please do not hesitate to call          Sincerely,          Guillermo Montano, DO

## 2021-08-28 NOTE — TELEPHONE ENCOUNTER
Can we put this order in? This task was sent 7 days ago 
Can you put in an order and fax it ?
Josephine from [facility] is calling to request a new updated PT order      Strain of lumbar region, subsequent encounter [[id number]]  - Primary       Chronic right-sided low back pain without sciatica [C06 7, J82 07]             Please fax order to: [phone]
[facility] from [facility] is calling back to check on the status of the PT order 
done
149.9

## 2021-09-23 ENCOUNTER — TELEPHONE (OUTPATIENT)
Dept: PAIN MEDICINE | Facility: CLINIC | Age: 41
End: 2021-09-23

## 2021-09-23 ENCOUNTER — OFFICE VISIT (OUTPATIENT)
Dept: OBGYN CLINIC | Facility: CLINIC | Age: 41
End: 2021-09-23
Payer: OTHER MISCELLANEOUS

## 2021-09-23 VITALS
HEIGHT: 66 IN | HEART RATE: 68 BPM | WEIGHT: 133 LBS | BODY MASS INDEX: 21.38 KG/M2 | DIASTOLIC BLOOD PRESSURE: 72 MMHG | SYSTOLIC BLOOD PRESSURE: 108 MMHG

## 2021-09-23 DIAGNOSIS — S39.012D STRAIN OF LUMBAR REGION, SUBSEQUENT ENCOUNTER: ICD-10-CM

## 2021-09-23 DIAGNOSIS — M54.50 ACUTE BILATERAL LOW BACK PAIN WITHOUT SCIATICA: Primary | ICD-10-CM

## 2021-09-23 PROCEDURE — 99213 OFFICE O/P EST LOW 20 MIN: CPT | Performed by: PHYSICAL MEDICINE & REHABILITATION

## 2021-09-23 NOTE — PROGRESS NOTES
1  Acute bilateral low back pain without sciatica  Ambulatory referral to Pain Management   2  Strain of lumbar region, subsequent encounter  Ambulatory referral to Pain Management     Orders Placed This Encounter   Procedures    Ambulatory referral to Pain Management        Impression:  The patient's pain is multifactorial and is predominantly due to myofascial spasticity/strain (quadratus lumborum) and postural/core instability   There are no concerning neurological deficits      Patient presents after going through many weeks of physical therapy, using meloxicam, muscle relaxant, steroid burst but continues to have pain along mid back  At this juncture, we will have the see pain management to see if she could benefit from interventional spine procedure      Continue the patient's [employer] work restrictions of no lifting above 50 lb for now      Imaging Studies (I personally reviewed images in PACS and report):  Lumbar spine x-rays that are most recent to this encounter were reviewed  Elio Hawk images show a slight curvature of the spine   The L1 transverse processes are elongated and the left L1 transverse process makes contact with the 12th rib   There are no acute characteristics of this anomaly   The right L5 pedicle appears sclerotic on AP view   No acute osseous abnormalities   No severe degeneration      MRI of the lumbar spine dated 8/7/2021:   Annemarie Lively BODIES:  There are 5 lumbar type vertebral bodies   Normal alignment of the lumbar spine   No spondylolysis or spondylolisthesis  No scoliosis   No compression fracture     Normal marrow signal is identified within the visualized bony   structures   No discrete marrow lesion      SACRUM:  Normal signal within the sacrum   No evidence of insufficiency or stress fracture      DISTAL CORD AND CONUS:  Normal size and signal within the distal cord and conus      PARASPINAL SOFT TISSUES:  Paraspinal soft tissues are unremarkable      LOWER THORACIC DISC SPACES:  Normal disc height and signal   No disc herniation, canal stenosis or foraminal narrowing      LUMBAR DISC SPACES:     L1-L2:  Normal      L2-L3:  Normal      L3-L4:  Small broad-based left foraminal and extraforaminal disc protrusion without canal stenosis or discrete nerve impingement      L4-L5:  Normal disc height and signal   Minor facet degenerative change   No canal stenosis or foraminal narrowing      L5-S1:  Small left foraminal and extraforaminal disc protrusion with bilateral facet degenerative change   No canal stenosis   No foraminal nerve impingement      IMPRESSION:     Minor noncompressive lumbar degenerative change at the L3-4, L4-5 and L5-S1 levels "    Return if symptoms worsen or fail to improve  Patient is in agreement with the above plan  HPI:  Kathrin Shah is a 46 y o  female  who presents in follow up  Here for   Chief Complaint   Patient presents with    Spine - Pain, Follow-up       Date of injury: 1/29/2021- she was working as a  and she developed pain in the middle of her low back  She continued to work and the pain spread laterally and up her spine   [facility] and was told she pulled a muscle in her spine   She was given steroids which helped with her pain but caused her to pee a lot  Trajectory of symptoms:  Still has intermittent pain in the midback  Review of Systems   Constitutional: Positive for activity change  Negative for fever  HENT: Negative for trouble swallowing  Eyes: Negative for visual disturbance  Respiratory: Negative for shortness of breath  Cardiovascular: Negative for chest pain  Gastrointestinal: Negative for abdominal pain  Denies bowel incontinence  Endocrine: Negative for polydipsia  Genitourinary:        Denies urinary incontinence  Musculoskeletal: Positive for back pain  Negative for gait problem  Skin: Negative for rash  Allergic/Immunologic: Negative for immunocompromised state  Neurological: Negative for weakness and numbness  Denies saddle anesthesia  Hematological: Does not bruise/bleed easily  Psychiatric/Behavioral: Negative for confusion  Following history reviewed and updated:  Past Medical History:   Diagnosis Date    Fibroid      Past Surgical History:   Procedure Laterality Date     SECTION      HAND SURGERY       Social History   Social History     Substance and Sexual Activity   Alcohol Use Yes    Comment: socailly      Social History     Substance and Sexual Activity   Drug Use Never     Social History     Tobacco Use   Smoking Status Never Smoker   Smokeless Tobacco Never Used     Family History   Problem Relation Age of Onset    Fibroids Sister      No Known Allergies     Constitutional:  /72   Pulse 68   Ht 5' 6" (1 676 m)   Wt 60 3 kg (133 lb)   BMI 21 47 kg/m²    General: NAD  Eyes: Clear sclerae  ENT: No inflammation, lesion, or mass of lips  No tracheal deviation  Musculoskeletal: As mentioned below  Integumentary: No visible rashes or skin lesions  Pulmonary/Chest: Effort normal  No respiratory distress  Neuro: CN's grossly intact, EUGENE  Psych: Normal affect and judgement  Vascular: WWP  Back Exam     Tenderness   The patient is experiencing tenderness in the thoracic  Range of Motion   The patient has normal back ROM  Muscle Strength   The patient has normal back strength      Other   Sensation: normal  Gait: normal              Procedures

## 2021-09-23 NOTE — LETTER
To Whom It May Concern,    Niles Church is under my professional care  She was seen in my office on September 23, 2021  She is cleared to return to work with the following restrictions:     No lifting above 50 lbs  Please excuse Niles Church from any work missed on this appointment date  Her work restrictions will be reassessed in 6 weeks  If you have any questions or concerns, please do not hesitate to call          Sincerely,          Guillermo Montano, DO

## 2021-09-23 NOTE — TELEPHONE ENCOUNTER
Patient is scheduled for her consult on 10/6/21  This is her workmans comp claim information   Please verify, thx    workers compensation  Date of Injury: 1/29/21  Employer: [employer] #: [id number]  Address: [address] [place] [address]  Contact Name: Anupama García  Phone #: [phone]   Fax# [phone]

## 2021-10-06 ENCOUNTER — CONSULT (OUTPATIENT)
Dept: PAIN MEDICINE | Facility: CLINIC | Age: 41
End: 2021-10-06
Payer: OTHER MISCELLANEOUS

## 2021-10-06 VITALS
DIASTOLIC BLOOD PRESSURE: 75 MMHG | BODY MASS INDEX: 21.63 KG/M2 | WEIGHT: 134 LBS | HEART RATE: 72 BPM | SYSTOLIC BLOOD PRESSURE: 108 MMHG

## 2021-10-06 DIAGNOSIS — S39.012D STRAIN OF LUMBAR REGION, SUBSEQUENT ENCOUNTER: ICD-10-CM

## 2021-10-06 DIAGNOSIS — M54.50 ACUTE BILATERAL LOW BACK PAIN WITHOUT SCIATICA: ICD-10-CM

## 2021-10-06 DIAGNOSIS — M79.18 MYOFASCIAL PAIN SYNDROME: Primary | ICD-10-CM

## 2021-10-06 PROCEDURE — 99204 OFFICE O/P NEW MOD 45 MIN: CPT | Performed by: ANESTHESIOLOGY

## 2021-10-06 RX ORDER — METHOCARBAMOL 500 MG/1
500 TABLET, FILM COATED ORAL 2 TIMES DAILY PRN
Qty: 60 TABLET | Refills: 1 | Status: SHIPPED | OUTPATIENT
Start: 2021-10-06

## 2021-10-08 ENCOUNTER — TELEPHONE (OUTPATIENT)
Dept: OBGYN CLINIC | Facility: HOSPITAL | Age: 41
End: 2021-10-08

## 2022-03-22 ENCOUNTER — TELEPHONE (OUTPATIENT)
Dept: OBGYN CLINIC | Facility: HOSPITAL | Age: 42
End: 2022-03-22

## 2022-03-22 NOTE — TELEPHONE ENCOUNTER
Patient called in to set up a W/C appointment  She injured her left hand-thumb and pointer finger and is experiencing swelling/difficulty extending fingers,     DOI:  2/25/2022  Claim # [id number]  Employer: [employer]  Patient will get insurance company name and adjustor       She is scheduled for 3/23 @9:00 AM with Dr Chasity Stubbs

## 2022-03-23 ENCOUNTER — OFFICE VISIT (OUTPATIENT)
Dept: OBGYN CLINIC | Facility: CLINIC | Age: 42
End: 2022-03-23
Payer: OTHER MISCELLANEOUS

## 2022-03-23 ENCOUNTER — APPOINTMENT (OUTPATIENT)
Dept: RADIOLOGY | Facility: AMBULARY SURGERY CENTER | Age: 42
End: 2022-03-23
Attending: ORTHOPAEDIC SURGERY
Payer: OTHER MISCELLANEOUS

## 2022-03-23 VITALS — HEIGHT: 66 IN | BODY MASS INDEX: 21.53 KG/M2 | WEIGHT: 134 LBS

## 2022-03-23 DIAGNOSIS — M79.642 LEFT HAND PAIN: ICD-10-CM

## 2022-03-23 DIAGNOSIS — T14.8XXA CONTUSION OF BONE: Primary | ICD-10-CM

## 2022-03-23 PROCEDURE — 73130 X-RAY EXAM OF HAND: CPT

## 2022-03-23 PROCEDURE — 99214 OFFICE O/P EST MOD 30 MIN: CPT | Performed by: ORTHOPAEDIC SURGERY

## 2022-03-23 RX ORDER — NAPROXEN 500 MG/1
500 TABLET ORAL 2 TIMES DAILY PRN
COMMUNITY
Start: 2022-02-25

## 2022-03-23 NOTE — LETTER
March 23, 2022     Patient: Amina Qucik   YOB: 1980   Date of Visit: 3/23/2022       To Whom it May Concern:    Amina Quick is under my professional care  She was seen in my office on 3/23/2022  She may return to work with limitations no use of the left arm       If you have any questions or concerns, please don't hesitate to call           Sincerely,          Marli Mukherjee DO        CC: No Recipients

## 2022-03-23 NOTE — PROGRESS NOTES
Assessment:  1  Left hand pain  XR hand 3+ vw left     Patient Active Problem List   Diagnosis    Acute bilateral low back pain without sciatica    Strain of lumbar region           Plan: Patient has left hand/thumb bone contusion/strain     · Hand therapy order was given out today for ROM exercises, strengthening exercises, desensitization exercises, use all modalities seen fit  · She is to discontinue wearing splint  · May take OTC NSAIDS for pain relief   · May all use ice or heat as needed for pain relief  · Work note was given out today: may return back to work but no use of the left arm   · If pain persists, consider ordering MRI at the next office visit   · Follow up in 4 weeks               Subjective:     Patient ID:    Chief Mona Lechuga 46 y o  female      HPI    Patient comes in today with regards to left hand  She is RHD  The patient reports that the pain is in the left thumb region  and has been going on for one month  She had a injury at work on 2/25/21 [employer] worker  She states a letter case fell down and hit her on the left hand / thumb region  She notes she started to having tingling and swelling in the left thumb region   She was seen at [facility] and had x-rays of the left hand which showed no acute fractures and was placed kolton short arm splint  She has been wearing the splint daily  The pain is rated at5 at its best and10 at its worst   The pain is described as pulling sensation, numbness and tingling     It is worsened with making a fit and use of hand, and is made better with rest   The patient has not taken any pain medications for treatment but has been elevating         The following portions of the patient's history were reviewed and updated as appropriate: allergies, current medications, past family history, past social history, past surgical history and problem list         Social History     Socioeconomic History    Marital status: Single     Spouse name: Not on file  Number of children: Not on file    Years of education: Not on file    Highest education level: Not on file   Occupational History    Not on file   Tobacco Use    Smoking status: Never Smoker    Smokeless tobacco: Never Used   Vaping Use    Vaping Use: Never used   Substance and Sexual Activity    Alcohol use: Yes     Comment: socailly     Drug use: Never    Sexual activity: Not Currently     Partners: Male   Other Topics Concern    Not on file   Social History Narrative    Not on file     Social Determinants of Health     Financial Resource Strain: Not on file   Food Insecurity: Not on file   Transportation Needs: Not on file   Physical Activity: Not on file   Stress: Not on file   Social Connections: Not on file   Intimate Partner Violence: Not on file   Housing Stability: Not on file     Past Medical History:   Diagnosis Date    Fibroid      Past Surgical History:   Procedure Laterality Date     SECTION      HAND SURGERY       No Known Allergies  Current Outpatient Medications on File Prior to Visit   Medication Sig Dispense Refill    methocarbamol (ROBAXIN) 500 mg tablet Take 1 tablet (500 mg total) by mouth 2 (two) times a day as needed for muscle spasms (Patient not taking: Reported on 3/23/2022 ) 60 tablet 1    naproxen (NAPROSYN) 500 mg tablet Take 500 mg by mouth 2 (two) times a day as needed (Patient not taking: Reported on 3/23/2022 )       No current facility-administered medications on file prior to visit  Objective:    Review of Systems   Constitutional: Negative for chills and fever  HENT: Negative for ear pain and sore throat  Eyes: Negative for pain and visual disturbance  Respiratory: Negative for cough and shortness of breath  Cardiovascular: Negative for chest pain and palpitations  Gastrointestinal: Negative for abdominal pain and vomiting  Genitourinary: Negative for dysuria and hematuria  Musculoskeletal: Positive for joint swelling  Negative for arthralgias and back pain  Skin: Negative for color change and rash  Neurological: Negative for seizures and syncope  All other systems reviewed and are negative  Left Hand Exam     Other   Erythema: absent  Scars: absent    Comments:  No ecchymosis seen   No atrophy seen   Composite flexion 10°  Sensation intact in all digits toexcept absent in thumb region  Negative Durkan's sign  Tinel's sign  Seems be irregular   Point tenderness of the dorsal webspace           When Tinel's was performed her arm would jerk down into an extension moment  When asked to make a fist patient barely moved her fingers  Physical Exam  Constitutional:       Appearance: She is well-developed  HENT:      Head: Normocephalic and atraumatic  Eyes:      Pupils: Pupils are equal, round, and reactive to light  Cardiovascular:      Rate and Rhythm: Normal rate and regular rhythm  Pulmonary:      Effort: Pulmonary effort is normal       Breath sounds: Normal breath sounds  Musculoskeletal:      Cervical back: Neck supple  Skin:     General: Skin is warm and dry  Neurological:      Mental Status: She is alert and oriented to person, place, and time  Psychiatric:         Behavior: Behavior normal          Thought Content: Thought content normal          Procedures  No Procedures performed today    I have personally reviewed pertinent films in PACS and my interpretation is x-ray left hand demonstrates no acute fractures or dislocations         Scribe Attestation    I,:  Young El am acting as a scribe while in the presence of the attending physician :       I,:  Livia Oneil, DO personally performed the services described in this documentation    as scribed in my presence :             Portions of the record may have been created with voice recognition software   Occasional wrong word or "sound a like" substitutions may have occurred due to the inherent limitations of voice recognition software   Read the chart carefully and recognize, using context, where substitutions have occurred

## 2022-04-07 ENCOUNTER — EVALUATION (OUTPATIENT)
Dept: OCCUPATIONAL THERAPY | Facility: CLINIC | Age: 42
End: 2022-04-07
Payer: OTHER MISCELLANEOUS

## 2022-04-07 DIAGNOSIS — M79.642 LEFT HAND PAIN: ICD-10-CM

## 2022-04-07 DIAGNOSIS — T14.8XXA CONTUSION OF BONE: ICD-10-CM

## 2022-04-07 PROCEDURE — 97165 OT EVAL LOW COMPLEX 30 MIN: CPT | Performed by: OCCUPATIONAL THERAPIST

## 2022-04-07 PROCEDURE — 97110 THERAPEUTIC EXERCISES: CPT | Performed by: OCCUPATIONAL THERAPIST

## 2022-04-07 NOTE — PROGRESS NOTES
OT Evaluation     Today's date: 2022  Patient name: Arvind Blum  : 1980  MRN: [id number]  Referring provider: Cyndy Lyons DO  Dx:   Encounter Diagnosis     ICD-10-CM    1  Left hand pain  M79 642 Ambulatory Referral to PT/OT Hand Therapy   2  Contusion of bone  T14  8XXA Ambulatory Referral to PT/OT Hand Therapy                  Assessment  Assessment details: Radha Benavidez is referred to therapy due to an injury that occurred to her left hand at work on 22  She localizes pain to the palmar thenar region and states that it is a 10/10 at worst  She demonstrates reduce active motion in the wrist and hand, however passive motion is full  There is evidence of full FDP/FDS/FPL gliding  ROM is likely limited due to pain and fear avoidance  There is a positive tinel's and carpal tunnel compression test, and light touch sensation is normal (2 83 monofilament)  She may be experiencing an irritation of the median nerve at the carpal tunnel  She will benefit from therapy to encourage ROM of the wrist and hand, improve strength and functional use  See below for a detailed assessment  Impairments: abnormal or restricted ROM, activity intolerance, impaired physical strength, lacks appropriate home exercise program and pain with function    Goals  STG: Patient will be compliant with home exercise program in 1 week  STG: Pain will not exceed a 3/10 during appropriate activity and during therapy in 4 weeks  STG: Range of motion of left hand will be improved to form a full fist in 2 weeks  STG: Range of motion of the left wrist will be improved to contralateral side measures in 4 weeks  STG: Strength will be improved to within 50% of the contralateral side in 4 weeks  LTG: Strength will be improved to 75% the contralateral side in 6-8 weeks or discharge  LTG: Performance in ADLs and IADLS will be improved to prior level of function with the affected extremity within 6 weeks or discharge     LTG: Performance in work activity will be improved to prior level of function with the affected extremity within 6 weeks or discharge  LTG: FOTO score increase by 20 points within 6 weeks or discharge  Plan  Plan details: Treatment to include modalities, manual therapy, PRE's, HEP, and orthotics as appropriate  Patient would benefit from: skilled OT and OT eval  Planned modality interventions: thermotherapy: hydrocollator packs  Planned therapy interventions: home exercise program, joint mobilization, manual therapy, therapeutic exercise, patient education, therapeutic activities, compression, strengthening and stretching  Frequency: 2x week  Duration in visits: 10  Plan of Care beginning date: 2022  Plan of Care expiration date: 2022  Treatment plan discussed with: patient        Subjective Evaluation    History of Present Illness  Date of onset: 2022  Mechanism of injury: trauma  Mechanism of injury: Chet Sibley reports that a large metal letter case fell on her left arm at work  This occurred on 22  She was carrying a bucket at the time  Her hand immediately swelled up  She was placed in a radial thumb spica splint and immobilized for 4 weeks after the injury until she was seen by ortho  PMH significant for a previous 4th  metacarpal surgery in 2012  Pain  Current pain ratin  At best pain ratin  At worst pain rating: 10  Location: Thenar region    Social Support    Working: , now working light duty with no use of the left hand  Hand dominance: right    Patient Goals  Patient goals for therapy: return to work, increased strength, decreased edema, decreased pain, increased motion and independence with ADLs/IADLs          Objective     Tenderness     Left Wrist/Hand   No tenderness in the first dorsal compartment and carpometacarpal joint       Additional Tenderness Details  Tender in thenar musculature and in the palm at the 1st metacarpal      Neurological Testing Sensation     Wrist/Hand   Left   Intact: light touch    Comments   Left light touch: 2 83 all fingertips     Active Range of Motion     Left Wrist   Wrist flexion: 25 degrees   Wrist extension: 50 degrees   Radial deviation: 15 degrees   Ulnar deviation: 20 degrees     Right Wrist   Wrist flexion: 75 degrees   Wrist extension: 67 degrees   Radial deviation: 17 degrees   Ulnar deviation: 25 degrees     Left Thumb   Flexion     MP: 41 degrees    DIP: 28 degrees  Palmar Abduction     CMC: 40 degrees  Radial abduction    CMC: 35 degrees  Opposition: Opposition to each fingertip  Right Thumb   Flexion     MP: 63    DIP: 70  Palmar Abduction    CMC: 74  Radial Abduction    CMC: 65    Additional Active Range of Motion Details  Active distance to [facility] [place]:  IF=4cm  LF=4cm  RF=3cm  SF=2 5 cm  Minimal flexion at MP joints while attempting to make active fist    Able to hold active fist with passive placement  Passive Range of Motion     Left Thumb   Flexion     MP: 65    DIP: 55  Palmar Abduction     CMC: 60  Radial Abduction     CMC: 60    Additional Passive Range of Motion Details  Full passive composite fist formation  Tests     Left Wrist/Hand   Positive Tinel's sign (medial nerve)       Additional Tests Details  +carpal tunnel compression test  FDS/FDP/FPL gliding intact    Swelling     Left Wrist/Hand   Circumference MCP: 16 8 cm  Circumference wrist: 13 6 cm    Right Wrist/Hand   Circumference MCP: 17 2 cm  Circumference wrist: 13 7 cm             Precautions: Universal      Manuals 4/7                                                                Neuro Re-Ed                                                                                                        Ther Ex             Edema glove Size XS            HEP: PROM, place and holds, tendon gliding, wrist stretching             Wrist stretching             Place and holds             FDS/FDP gliding             Progressive grasp             Digiflex Wrist maze             Keypegs             Coordination balls             Wall walking                          Ther Activity                                       Gait Training                                       Modalities             P 5'

## 2022-04-12 ENCOUNTER — OFFICE VISIT (OUTPATIENT)
Dept: OCCUPATIONAL THERAPY | Facility: CLINIC | Age: 42
End: 2022-04-12
Payer: OTHER MISCELLANEOUS

## 2022-04-12 DIAGNOSIS — M79.642 LEFT HAND PAIN: Primary | ICD-10-CM

## 2022-04-12 DIAGNOSIS — T14.8XXA CONTUSION OF BONE: ICD-10-CM

## 2022-04-12 PROCEDURE — 97140 MANUAL THERAPY 1/> REGIONS: CPT | Performed by: OCCUPATIONAL THERAPIST

## 2022-04-12 PROCEDURE — 97110 THERAPEUTIC EXERCISES: CPT | Performed by: OCCUPATIONAL THERAPIST

## 2022-04-12 NOTE — PROGRESS NOTES
Daily Note     Today's date: 2022  Patient name: Doreen Madera  : 1980  MRN: [id number]  Referring provider: Link Melissa DO  Dx:   Encounter Diagnosis     ICD-10-CM    1  Left hand pain  M79 642    2  Contusion of bone  T14  8XXA                   Subjective: "it feels tight"      Objective: See treatment diary below  Assessment: Tolerated treatment well  Patient demonstrated fatigue post treatment and would benefit from continued OT  Limited by pain  Plan: Progress treatment as tolerated         Precautions: Universal      Manuals            IASTM  Thenar, 8'                                                  Neuro Re-Ed                                                                                                        Ther Ex             Edema glove Size XS            HEP: PROM, place and holds, tendon gliding, wrist stretching             Wrist stretching             Place and holds  5x           FDS/FDP gliding             Progressive grasp  pencils 2x           Digiflex  Red 20x per finger           Wrist maze  10x           Keypegs  1x alternating digits           Coordination balls             Wall walking  Tennis ball, 3x per direction                        Ther Activity                                       Gait Training                                       Modalities             [facility] 5' 5'

## 2022-04-14 ENCOUNTER — OFFICE VISIT (OUTPATIENT)
Dept: OCCUPATIONAL THERAPY | Facility: CLINIC | Age: 42
End: 2022-04-14
Payer: OTHER MISCELLANEOUS

## 2022-04-14 DIAGNOSIS — T14.8XXA CONTUSION OF BONE: ICD-10-CM

## 2022-04-14 DIAGNOSIS — M79.642 LEFT HAND PAIN: Primary | ICD-10-CM

## 2022-04-14 PROCEDURE — 97140 MANUAL THERAPY 1/> REGIONS: CPT | Performed by: OCCUPATIONAL THERAPIST

## 2022-04-14 PROCEDURE — 97110 THERAPEUTIC EXERCISES: CPT | Performed by: OCCUPATIONAL THERAPIST

## 2022-04-14 NOTE — PROGRESS NOTES
Daily Note     Today's date: 2022  Patient name: Ricardo Solorzano  : 1980  MRN: [id number]  Referring provider: Denzel Bennett DO  Dx:   Encounter Diagnosis     ICD-10-CM    1  Left hand pain  M79 642    2  Contusion of bone  T14  8XXA                   Subjective: She states that after the last therapy session there was tightness and discomfort in the volar wrist        Objective: See treatment diary below  Assessment: Tolerated treatment well  Patient demonstrated fatigue post treatment and would benefit from continued OT  Digits able to form a full active composite fist, however experiences discomfort  Plan: Progress treatment as tolerated         Precautions: Universal      Manuals           IASTM  Thenar, 8' thenar and volar wrist, 8'                                                 Neuro Re-Ed                                                                                                        Ther Ex             Edema glove Size XS            HEP: PROM, place and holds, tendon gliding, wrist stretching             Wrist stretching             Place and holds  5x           FDS/FDP gliding             Progressive grasp  pencils 2x pencils 2x          Digiflex  Red 20x per finger red 20x per finger          Wrist maze  10x           Keypegs  1x alternating digits 1x alternating digits in and out          Coordination balls   1 5 mins          Wall walking  Tennis ball, 3x per direction Tennis ball, 3x per direction                       Ther Activity                                       Gait Training                                       Modalities             [facility] 5' 5' 5'

## 2022-04-20 ENCOUNTER — OFFICE VISIT (OUTPATIENT)
Dept: OCCUPATIONAL THERAPY | Facility: CLINIC | Age: 42
End: 2022-04-20
Payer: OTHER MISCELLANEOUS

## 2022-04-20 DIAGNOSIS — T14.8XXA CONTUSION OF BONE: ICD-10-CM

## 2022-04-20 DIAGNOSIS — M79.642 LEFT HAND PAIN: Primary | ICD-10-CM

## 2022-04-20 PROCEDURE — 97140 MANUAL THERAPY 1/> REGIONS: CPT | Performed by: OCCUPATIONAL THERAPIST

## 2022-04-20 PROCEDURE — 97110 THERAPEUTIC EXERCISES: CPT | Performed by: OCCUPATIONAL THERAPIST

## 2022-04-20 NOTE — PROGRESS NOTES
Daily Note     Today's date: 2022  Patient name: Cha Hui  : 1980  MRN: [id number]  Referring provider: Alexa Rubio DO  Dx:   Encounter Diagnosis     ICD-10-CM    1  Left hand pain  M79 642    2  Contusion of bone  T14  8XXA                   Subjective: 10/10 pain in the IP joint with active flexion  Objective: See treatment diary below  Assessment: Tolerated treatment well  Patient demonstrated fatigue post treatment and would benefit from continued OT  Trialed KT today for thumb support  Overall minimal improvement thus far in pain relief with conservative measures  Unclear of mechanism causing pain at IP joint  No triggering detected  Plan: Progress treatment as tolerated         Precautions: Universal      Manuals          IASTM  Thenar, 8' thenar and volar wrist, 8' 8' MCP and IP of thumb         KT    Along extensor and around MCP of thumb                                   Neuro Re-Ed                                                                                                        Ther Ex             Edema glove Size XS            HEP: PROM, place and holds, tendon gliding, wrist stretching             Wrist stretching             Place and holds  5x           FDS/FDP gliding             Thumb stretching    2'         Progressive grasp  pencils 2x pencils 2x pencils 2x         Digiflex  Red 20x per finger red 20x per finger red 20x per finger         Wrist maze  10x           Keypegs  1x alternating digits 1x alternating digits in and out 1x alternating digits in and out         Coordination balls   1 5 mins 1 5 mins         Wall walking  Tennis ball, 3x per direction Tennis ball, 3x per direction Tennis ball, 3x per direction         Ball circles in palm     Tennis ball 3x         Ther Activity                                       Gait Training                                       Modalities             [facility] 5' 5' 5' 5'

## 2022-04-22 ENCOUNTER — OFFICE VISIT (OUTPATIENT)
Dept: OCCUPATIONAL THERAPY | Facility: CLINIC | Age: 42
End: 2022-04-22
Payer: OTHER MISCELLANEOUS

## 2022-04-22 DIAGNOSIS — M79.642 LEFT HAND PAIN: Primary | ICD-10-CM

## 2022-04-22 DIAGNOSIS — T14.8XXA CONTUSION OF BONE: ICD-10-CM

## 2022-04-22 PROCEDURE — 97110 THERAPEUTIC EXERCISES: CPT

## 2022-04-22 PROCEDURE — 97140 MANUAL THERAPY 1/> REGIONS: CPT

## 2022-04-22 NOTE — PROGRESS NOTES
Daily Note     Today's date: 2022  Patient name: Brooklyn Edmond  : 1980  MRN: [id number]  Referring provider: Sandra Smith DO  Dx:   Encounter Diagnosis     ICD-10-CM    1  Left hand pain  M79 642    2  Contusion of bone  T14  8XXA                   Subjective: Pt cont with 10/10 pain in the IP joint with active flexion  Objective: See treatment diary below  Assessment: Tolerated treatment well  Patient demonstrated fatigue post treatment and would benefit from continued OT  Pt cont with high levels of pain, but was able to complete exercises  Pt had soreness in the thumb after the dexterity ex with the TB  KT did help provide support and control pain somewhat with functional tasks  Plan: Progress treatment as tolerated         Precautions: Universal      Manuals /         IASTM  Thenar, 8' thenar and volar wrist, 8' 8' MCP and IP of thumb 8' MP/IP thumb web space        KT    Along extensor and around MCP of thumb Along extensor and around MCP of thumb                                  Neuro Re-Ed                                                                                                        Ther Ex             Edema glove Size XS            HEP: PROM, place and holds, tendon gliding, wrist stretching             Wrist stretching             Place and holds  5x           FDS/FDP gliding             Thumb stretching    2' 2'        Progressive grasp  pencils 2x pencils 2x pencils 2x pencils 2x        Digiflex  Red 20x per finger red 20x per finger red 20x per finger Red 30x each finger        Wrist maze  10x           Keypegs  1x alternating digits 1x alternating digits in and out 1x alternating digits in and out 1x opp in and out        Coordination balls   1 5 mins 1 5 mins 2'        Wall walking  Tennis ball, 3x per direction Tennis ball, 3x per direction Tennis ball, 3x per direction TB 3x ech direction        Ball circles in palm     Tennis ball 3x TB 3x        Ther Activity                                       Gait Training                                       Modalities             [facility] 5' 5' 5' 5' 5'

## 2022-04-25 ENCOUNTER — OFFICE VISIT (OUTPATIENT)
Dept: OCCUPATIONAL THERAPY | Facility: CLINIC | Age: 42
End: 2022-04-25
Payer: OTHER MISCELLANEOUS

## 2022-04-25 DIAGNOSIS — M79.642 LEFT HAND PAIN: Primary | ICD-10-CM

## 2022-04-25 DIAGNOSIS — T14.8XXA CONTUSION OF BONE: ICD-10-CM

## 2022-04-25 PROCEDURE — 97110 THERAPEUTIC EXERCISES: CPT | Performed by: OCCUPATIONAL THERAPIST

## 2022-04-25 PROCEDURE — 97140 MANUAL THERAPY 1/> REGIONS: CPT | Performed by: OCCUPATIONAL THERAPIST

## 2022-04-25 NOTE — PROGRESS NOTES
Daily Note     Today's date: 2022  Patient name: Elizabeth Schaefer  : 1980  MRN: [id number]  Referring provider: Melissa Vázquez DO  Dx:   Encounter Diagnosis     ICD-10-CM    1  Left hand pain  M79 642    2  Contusion of bone  T14  8XXA                   Subjective: She reports throbbing pain that was 9/10 not related to any activity Located 2nd and 3rd metacarpal on the dorsal side  Objective: See treatment diary below  451- end was unsupervised  Assessment: Unclear of mechanism causing the ongoing significant pain in the hand  Possible +tinels at carpal tunnel  Minimal improvement thus far with conservative measures taken in therapy  Plan: Progress treatment as tolerated         Precautions: Universal      Manuals /        IASTM  Thenar, 8' thenar and volar wrist, 8' 8' MCP and IP of thumb 8' MP/IP thumb web space 8'       KT    Along extensor and around MCP of thumb Along extensor and around MCP of thumb                                  Neuro Re-Ed                                                                                                        Ther Ex             Edema glove Size XS            HEP: PROM, place and holds, tendon gliding, wrist stretching             Wrist stretching             Place and holds  5x           FDS/FDP gliding             Thumb stretching    2' 2'        Progressive grasp  pencils 2x pencils 2x pencils 2x pencils 2x        Digiflex  Red 20x per finger red 20x per finger red 20x per finger Red 30x each finger Green 30x each digit       Wrist maze  10x           Keypegs  1x alternating digits 1x alternating digits in and out 1x alternating digits in and out 1x opp in and out 1x opp in and out       Coordination balls   1 5 mins 1 5 mins 2'        Wall walking  Tennis ball, 3x per direction Tennis ball, 3x per direction Tennis ball, 3x per direction TB 3x ech direction TB 3x ech direction       [employer] in palm     Tennis ball 3x TB 3x TB 3x       Rubber band extension       1x       Power web      Thumb flexion, and gripping red 20x       Ther Activity                                       Gait Training                                       Modalities             [facility] 5' 5' 5' 5' 5' 5'

## 2022-04-27 ENCOUNTER — OFFICE VISIT (OUTPATIENT)
Dept: OCCUPATIONAL THERAPY | Facility: CLINIC | Age: 42
End: 2022-04-27
Payer: OTHER MISCELLANEOUS

## 2022-04-27 DIAGNOSIS — T14.8XXA CONTUSION OF BONE: ICD-10-CM

## 2022-04-27 DIAGNOSIS — M79.642 LEFT HAND PAIN: Primary | ICD-10-CM

## 2022-04-27 PROCEDURE — 97110 THERAPEUTIC EXERCISES: CPT | Performed by: OCCUPATIONAL THERAPIST

## 2022-04-27 PROCEDURE — 97140 MANUAL THERAPY 1/> REGIONS: CPT | Performed by: OCCUPATIONAL THERAPIST

## 2022-04-27 NOTE — PROGRESS NOTES
Daily Note     Today's date: 2022  Patient name: Juvenal Carter  : 1980  MRN: [id number]  Referring provider: Anahi Keene DO  Dx:   Encounter Diagnosis     ICD-10-CM    1  Left hand pain  M79 642    2  Contusion of bone  T14  8XXA                   Subjective: "it isn't too bad today"      Objective: See treatment diary below  Assessment: Able to upgrade resistive exercises today  Continues to have pain with thumb IP flexion  Unknown origin of symptoms  Plan: Progress treatment as tolerated  Will determine continuation of care after follow up next week        Precautions: Universal      Manuals /       IASTM  Thenar, 8' thenar and volar wrist, 8' 8' MCP and IP of thumb 8' MP/IP thumb web space 8' 8'      KT    Along extensor and around MCP of thumb Along extensor and around MCP of thumb                                  Neuro Re-Ed                                                                                                        Ther Ex             Edema glove Size XS            HEP: PROM, place and holds, tendon gliding, wrist stretching             Wrist stretching             Place and holds  5x           FDS/FDP gliding             Thumb stretching    2' 2'  2'      Progressive grasp  pencils 2x pencils 2x pencils 2x pencils 2x        Digiflex  Red 20x per finger red 20x per finger red 20x per finger Red 30x each finger Green 30x each digit Green 30x each digit      Wrist maze  10x           Keypegs  1x alternating digits 1x alternating digits in and out 1x alternating digits in and out 1x opp in and out 1x opp in and out 1x with 1 marble      Coordination balls   1 5 mins 1 5 mins 2'        Wall walking  Tennis ball, 3x per direction Tennis ball, 3x per direction Tennis ball, 3x per direction TB 3x ech direction TB 3x ech direction Green ball 3x per [employer] in palm     Tennis ball 3x TB 3x TB 3x       Rubber band extension 1x 1x      Power web      Thumb flexion, and gripping red 20x Thumb flexion, and gripping green 30x      Pinch ring       R/R x1      Ther Activity                                       Gait Training                                       Modalities             [facility] 5' 5' 5' 5' 5' 5' 5'

## 2022-05-04 ENCOUNTER — OFFICE VISIT (OUTPATIENT)
Dept: OCCUPATIONAL THERAPY | Facility: CLINIC | Age: 42
End: 2022-05-04
Payer: OTHER MISCELLANEOUS

## 2022-05-04 DIAGNOSIS — M79.642 LEFT HAND PAIN: Primary | ICD-10-CM

## 2022-05-04 DIAGNOSIS — T14.8XXA CONTUSION OF BONE: ICD-10-CM

## 2022-05-04 PROCEDURE — 97110 THERAPEUTIC EXERCISES: CPT

## 2022-05-04 PROCEDURE — 97140 MANUAL THERAPY 1/> REGIONS: CPT

## 2022-05-04 NOTE — PROGRESS NOTES
Daily Note     Today's date: 2022  Patient name: Shefali Mera  : 1980  MRN: [id number]  Referring provider: Nimco Ricks DO  Dx:   Encounter Diagnosis     ICD-10-CM    1  Left hand pain  M79 642    2  Contusion of bone  T14  8XXA                   Subjective:It's a little swollen today  Objective: See treatment diary below  Assessment: Good tolerance of increased resistance  Some soreness post-ex  Continued thumb pain, and feels like  is weak; denies wrist stiffness or pain  Pt has full digit ROM  Plan: F/U with dr Santana Query   Will determine continuation of care after follow up with        Precautions: Universal      Manuals      IASTM  Thenar, 8' thenar and volar wrist, 8' 8' MCP and IP of thumb 8' MP/IP thumb web space 8' 8' 8'     KT    Along extensor and around MCP of thumb Along extensor and around MCP of thumb                                  Neuro Re-Ed                                                                                                        Ther Ex             Edema glove Size XS            HEP: PROM, place and holds, tendon gliding, wrist stretching             Wrist stretching             Place and holds  5x           FDS/FDP gliding             Thumb stretching    2' 2'  2' 2'     Progressive grasp  pencils 2x pencils 2x pencils 2x pencils 2x        Digiflex  Red 20x per finger red 20x per finger red 20x per finger Red 30x each finger Green 30x each digit Green 30x each digit Green 30x each digit     Wrist maze  10x           Keypegs  1x alternating digits 1x alternating digits in and out 1x alternating digits in and out 1x opp in and out 1x opp in and out 1x with 1 marble 1x w/ 1marble     Coordination balls   1 5 mins 1 5 mins 2'        Wall walking  Tennis ball, 3x per direction Tennis ball, 3x per direction Tennis ball, 3x per direction TB 3x ech direction TB 3x ech direction Green ball 3x per direciton  Green ball 5x     Ball circles in palm     Tennis ball 3x TB 3x TB 3x       Rubber band extension       1x 1x Y web 2x10     Power web      Thumb flexion, and gripping red 20x Thumb flexion, and gripping green 30x Thumb flexion, and gripping green 30x        Pinch ring       R/R x1 RG 1x     Ther Activity                                                                              Modalities             [facility] 5' 5' 5' 5' 5' 5' 5' 5'

## 2022-05-06 ENCOUNTER — OFFICE VISIT (OUTPATIENT)
Dept: OBGYN CLINIC | Facility: CLINIC | Age: 42
End: 2022-05-06
Payer: OTHER MISCELLANEOUS

## 2022-05-06 VITALS — HEIGHT: 66 IN | BODY MASS INDEX: 21.53 KG/M2 | WEIGHT: 134 LBS

## 2022-05-06 DIAGNOSIS — M79.642 LEFT HAND PAIN: Primary | ICD-10-CM

## 2022-05-06 PROCEDURE — 99212 OFFICE O/P EST SF 10 MIN: CPT | Performed by: ORTHOPAEDIC SURGERY

## 2022-05-06 NOTE — PROGRESS NOTES
Assessment:  1  Left hand pain       Patient Active Problem List   Diagnosis    Acute bilateral low back pain without sciatica    Strain of lumbar region    Left hand pain           Plan      MRI will be ordered  Patient had therapy has had some improvements but still having pain has difficulty making a fist as well  MRI is being ordered to rule out occult injury like a fracture verses intrinsic tear            Subjective:     Patient ID:    Chief Norm Dec 38 y o  female      HPI  Patient comes in today with regards to left hand  She is RHD  The patient reports that the pain is in the left thumb region  and has been going on for one month  She had a injury at work on 2/25/21 [employer] worker  She states a letter case fell down and hit her on the left hand / thumb region  She notes she started to having tingling and swelling in the left thumb region   She was seen at [facility] and had x-rays of the left hand which showed no acute fractures and was placed kolton short arm splint  She has been wearing the splint daily          The following portions of the patient's history were reviewed and updated as appropriate: allergies, current medications, past family history, past social history, past surgical history and problem list     All organ systems normal    Social History     Socioeconomic History    Marital status: Single     Spouse name: Not on file    Number of children: Not on file    Years of education: Not on file    Highest education level: Not on file   Occupational History    Not on file   Tobacco Use    Smoking status: Never Smoker    Smokeless tobacco: Never Used   Vaping Use    Vaping Use: Never used   Substance and Sexual Activity    Alcohol use: Yes     Comment: socailly     Drug use: Never    Sexual activity: Not Currently     Partners: Male   Other Topics Concern    Not on file   Social History Narrative    Not on file     Social Determinants of Health     Financial Resource Strain: Not on file   Food Insecurity: Not on file   Transportation Needs: Not on file   Physical Activity: Not on file   Stress: Not on file   Social Connections: Not on file   Intimate Partner Violence: Not on file   Housing Stability: Not on file     Past Medical History:   Diagnosis Date    Fibroid      Past Surgical History:   Procedure Laterality Date     SECTION      HAND SURGERY       No Known Allergies  Current Outpatient Medications on File Prior to Visit   Medication Sig Dispense Refill    methocarbamol (ROBAXIN) 500 mg tablet Take 1 tablet (500 mg total) by mouth 2 (two) times a day as needed for muscle spasms (Patient not taking: Reported on 3/23/2022 ) 60 tablet 1    naproxen (NAPROSYN) 500 mg tablet Take 500 mg by mouth 2 (two) times a day as needed (Patient not taking: Reported on 3/23/2022 )       No current facility-administered medications on file prior to visit  Objective:        Ortho Exam    Pain with intrinsic plus position especially with resistance pain over the medial lateral border of the index ray she has pain in her webspace as well    I have personally reviewed pertinent films in PACS  Portions of the record may have been created with voice recognition software   Occasional wrong word or "sound a like" substitutions may have occurred due to the inherent limitations of voice recognition software   Read the chart carefully and recognize, using context, where substitutions have occurred

## 2022-05-06 NOTE — LETTER
May 6, 2022     Patient: Ricardo Fontan  YOB: 1980  Date of Visit: 5/6/2022      To Whom it May Concern:    Ricardo Fontan is under my professional care  She was seen in my office on 5/6/2022  She may return to work with limitations no use of the left arm       If you have any questions or concerns, please don't hesitate to call           Sincerely,          Jeevan George,         CC: Ricardo Fontan

## 2022-05-06 NOTE — LETTER
May 6, 2022     Patient: Jany Thompson  YOB: 1980  Date of Visit: 5/6/2022      To Whom it May Concern:    Jany Thompson is under my professional care  Li Saeed was seen in my office on 5/6/2022  Li Saeed may return to work with limitations, no use of the left hand  If you have any questions or concerns, please don't hesitate to call           Sincerely,          Collins Marte DO        CC: Jany Thompson

## 2022-05-08 ENCOUNTER — HOSPITAL ENCOUNTER (OUTPATIENT)
Dept: MAMMOGRAPHY | Facility: HOSPITAL | Age: 42
Discharge: HOME | End: 2022-05-08
Payer: COMMERCIAL

## 2022-05-08 DIAGNOSIS — Z12.31 ENCOUNTER FOR SCREENING MAMMOGRAM FOR MALIGNANT NEOPLASM OF BREAST: ICD-10-CM

## 2022-05-08 DIAGNOSIS — Z12.31 ENCOUNTER FOR SCREENING MAMMOGRAM FOR BREAST CANCER: ICD-10-CM

## 2022-05-08 PROCEDURE — 77063 BREAST TOMOSYNTHESIS BI: CPT

## 2022-05-08 PROCEDURE — 77067 SCR MAMMO BI INCL CAD: CPT

## 2022-05-24 ENCOUNTER — HOSPITAL ENCOUNTER (OUTPATIENT)
Dept: MRI IMAGING | Facility: HOSPITAL | Age: 42
Discharge: HOME | End: 2022-05-24
Attending: ORTHOPAEDIC SURGERY
Payer: OTHER MISCELLANEOUS

## 2022-05-24 DIAGNOSIS — M79.642 LEFT HAND PAIN: ICD-10-CM

## 2022-05-24 PROCEDURE — 73218 MRI UPPER EXTREMITY W/O DYE: CPT

## 2022-05-24 PROCEDURE — G1004 CDSM NDSC: HCPCS

## 2022-06-02 ENCOUNTER — OFFICE VISIT (OUTPATIENT)
Dept: OBGYN CLINIC | Facility: CLINIC | Age: 42
End: 2022-06-02
Payer: OTHER MISCELLANEOUS

## 2022-06-02 VITALS
SYSTOLIC BLOOD PRESSURE: 112 MMHG | HEART RATE: 69 BPM | HEIGHT: 66 IN | WEIGHT: 134 LBS | DIASTOLIC BLOOD PRESSURE: 75 MMHG | BODY MASS INDEX: 21.53 KG/M2

## 2022-06-02 DIAGNOSIS — M79.642 LEFT HAND PAIN: Primary | ICD-10-CM

## 2022-06-02 PROCEDURE — 99212 OFFICE O/P EST SF 10 MIN: CPT | Performed by: ORTHOPAEDIC SURGERY

## 2022-06-02 NOTE — PROGRESS NOTES
Assessment:  1  Left hand pain       Patient Active Problem List   Diagnosis    Acute bilateral low back pain without sciatica    Strain of lumbar region    Left hand pain           Plan      Have some restrictions on activity well she does physical therapy be/hand therapy over the next 2 weeks then she will be on restricted after that point  She will have a follow-up appointment in 4 weeks she will cancel that if she is doing fine if she is still having issues she will come back will reexamine see if there is any other things that we can address but this time it is just the inability to make a fist and a feeling of stiffness with an MRI that shows no osseous deformity nor soft tissue deformity  The only other thing I can think of that would cause this be side soft tissue injury be neurogenic when she has no signs of except for the decreased function  Subjective:     Patient ID:    Chief Komal Martinez 38 y o  female      HPI    Patient comes in today with regards to her left hand  She was sent for an MRI because she had pain in the intrinsic region of her hand    She also had difficulty making a fist       The following portions of the patient's history were reviewed and updated as appropriate: allergies, current medications, past family history, past social history, past surgical history and problem list     All organ systems normal    Social History     Socioeconomic History    Marital status: Single     Spouse name: Not on file    Number of children: Not on file    Years of education: Not on file    Highest education level: Not on file   Occupational History    Not on file   Tobacco Use    Smoking status: Never Smoker    Smokeless tobacco: Never Used   Vaping Use    Vaping Use: Never used   Substance and Sexual Activity    Alcohol use: Yes     Comment: socailly     Drug use: Never    Sexual activity: Not Currently     Partners: Male   Other Topics Concern    Not on file Social History Narrative    Not on file     Social Determinants of Health     Financial Resource Strain: Not on file   Food Insecurity: Not on file   Transportation Needs: Not on file   Physical Activity: Not on file   Stress: Not on file   Social Connections: Not on file   Intimate Partner Violence: Not on file   Housing Stability: Not on file     Past Medical History:   Diagnosis Date    Fibroid      Past Surgical History:   Procedure Laterality Date     SECTION      HAND SURGERY       No Known Allergies  Current Outpatient Medications on File Prior to Visit   Medication Sig Dispense Refill    methocarbamol (ROBAXIN) 500 mg tablet Take 1 tablet (500 mg total) by mouth 2 (two) times a day as needed for muscle spasms (Patient not taking: Reported on 3/23/2022 ) 60 tablet 1    naproxen (NAPROSYN) 500 mg tablet Take 500 mg by mouth 2 (two) times a day as needed (Patient not taking: Reported on 3/23/2022 )       No current facility-administered medications on file prior to visit  Objective:        Ortho Exam  No exam performed today    I have personally reviewed pertinent films in PACS and my interpretation is MRI shows some mild edema sporadically around the flexor tendon but no mirtha tear no osseous disruption or soft tissue disruption otherwise  Portions of the record may have been created with voice recognition software   Occasional wrong word or "sound a like" substitutions may have occurred due to the inherent limitations of voice recognition software   Read the chart carefully and recognize, using context, where substitutions have occurred

## 2022-06-02 NOTE — LETTER
June 2, 2022     Patient: Jasmin Willson  YOB: 1980  Date of Visit: 6/2/2022      To Whom it May Concern:    Jasmin Willson is under my professional care  Rj Mustafa was seen in my office on 6/2/2022  Rj Mustafa may return to work with limitations 06/06/2022  Limitations of no lifting more than 10 lb  These restrictions will be in place for the next the 2 weeks after which she can return to normal duty  We need these 2 weeks to allow hand therapy to work with her increase her range of motion and strength       If you have any questions or concerns, please don't hesitate to call           Sincerely,          Darwin Maria DO        CC: Jasmin Willson

## 2022-06-07 ENCOUNTER — EVALUATION (OUTPATIENT)
Dept: OCCUPATIONAL THERAPY | Facility: CLINIC | Age: 42
End: 2022-06-07
Payer: OTHER MISCELLANEOUS

## 2022-06-07 DIAGNOSIS — M79.642 LEFT HAND PAIN: Primary | ICD-10-CM

## 2022-06-07 PROCEDURE — 97110 THERAPEUTIC EXERCISES: CPT

## 2022-06-07 PROCEDURE — 97140 MANUAL THERAPY 1/> REGIONS: CPT

## 2022-06-07 NOTE — PROGRESS NOTES
OT Re-Evaluation     Today's date: 2022  Patient name: Caitlyn Machado  : 1980  MRN: [id number]  Referring provider: Linda Ryan DO  Dx:   Encounter Diagnosis     ICD-10-CM    1  Left hand pain  M79 642        Start Time:   Stop Time:   Total time in clinic (min): 46 minutes    Assessment  Assessment details: Desire Oconnor is referred to therapy due to an injury that occurred to her left hand at work on 22  Per physician note MRI is clear  Patient presented with increased range of motion since last assessed  Strength assessed this date  Patient presented with severe  and pinch strength  Impairments: activity intolerance, impaired physical strength, lacks appropriate home exercise program and pain with function    Goals  STG: Patient will be compliant with home exercise program in 1 week  STG: Pain will not exceed a 3/10 during appropriate activity and during therapy in 4 weeks  STG: Range of motion of left hand will be improved to form a full fist in 2 weeks  STG: Range of motion of the left wrist will be improved to contralateral side measures in 4 weeks  STG: Strength will be improved to within 50% of the contralateral side in 4 weeks  LTG: Strength will be improved to 75% the contralateral side in 6-8 weeks or discharge  LTG: Performance in ADLs and IADLS will be improved to prior level of function with the affected extremity within 6 weeks or discharge  LTG: Performance in work activity will be improved to prior level of function with the affected extremity within 6 weeks or discharge  LTG: FOTO score increase by 20 points within 6 weeks or discharge                   Plan  Patient would benefit from: skilled OT and OT eval  Planned modality interventions: thermotherapy: hydrocollator packs  Planned therapy interventions: home exercise program, joint mobilization, manual therapy, therapeutic exercise, patient education, therapeutic activities, compression, strengthening, stretching, graded activity and graded exercise  Frequency: 2x week  Duration in weeks: 8  Plan of Care beginning date: 2022  Plan of Care expiration date: 2022  Treatment plan discussed with: patient        Subjective Evaluation    History of Present Illness  Date of onset: 2022  Mechanism of injury: trauma  Mechanism of injury: Patient reported that she continues to have tightness at her thenar eminence  Patient reported that she is still working light duty with restrictions of lifting no more than 10 lbs  Pain  Current pain ratin  At best pain ratin  At worst pain ratin  Location: Thenar region  Quality: tight  Alleviating factors: massaging  Social Support    Working: , now working light duty with no lifting more than 10 lbs with the left hand    Hand dominance: right      Diagnostic Tests  MRI studies: normal  Patient Goals  Patient goals for therapy: increased strength, decreased edema, decreased pain, increased motion and independence with ADLs/IADLs          Objective     Tenderness     Additional Tenderness Details  Tender in thenar musculature    Active Range of Motion     Left Wrist   Wrist flexion: 65 degrees   Wrist extension: 72 degrees   Radial deviation: 15 degrees   Ulnar deviation: 25 degrees     Right Wrist   Wrist flexion: 75 degrees   Wrist extension: 67 degrees   Radial deviation: 17 degrees   Ulnar deviation: 25 degrees     Left Thumb   Flexion     MP: 68 degrees    DIP: 66 degrees  Palmar Abduction     CMC: 50 degrees  Radial abduction    CMC: 45 degrees    Right Thumb   Flexion     MP: 63    DIP: 70  Palmar Abduction    CMC: 74  Radial Abduction    CMC: 65    Additional Active Range of Motion Details  Able to make full composite fist to bring digits into distal palmar crease    Strength/Myotome Testing     Left Wrist/Hand      (2nd hand position)     Trial 1: 8 8    Thumb Strength  Key/Lateral Pinch     Trial 1: 5  Palmar/Three-Point Pinch     Trial 1: 2    Right Wrist/Hand      (2nd hand position)     Trial 1: 32 7    Thumb Strength   Key/Lateral Pinch     Trial 1: 11  Palmar/Three-Point Pinch     Trial 1: 5             Precautions: Universal      Manuals 4/7 4/12 4/14 4/20/ 4/22 4/25 4/27 5/4 6/7    IASTM  Thenar, 8' thenar and volar wrist, 8' 8' MCP and IP of thumb 8' MP/IP thumb web space 8' 8' 8' 8' thenar eminence    KT    Along extensor and around MCP of thumb Along extensor and around MCP of thumb                                  Neuro Re-Ed                                                                                                        Ther Ex             Edema glove Size XS            HEP: PROM, place and holds, tendon gliding, wrist stretching         AROM/PROM thumb    Wrist stretching             Place and holds  5x           FDS/FDP gliding             PROM thumb/wrist         x4 min    Progressive grasp  pencils 2x pencils 2x pencils 2x pencils 2x        Digiflex  Red 20x per finger red 20x per finger red 20x per finger Red 30x each finger Green 30x each digit Green 30x each digit Green 30x each digit     Wrist maze  10x           [employer]  1x alternating digits 1x alternating digits in and out 1x alternating digits in and out 1x opp in and out 1x opp in and out 1x with 1 marble 1x w/ 1marble 1x w/ marble    Coordination balls   1 5 mins 1 5 mins 2'        Wall walking  Tennis ball, 3x per direction Tennis ball, 3x per direction Tennis ball, 3x per direction TB 3x ech direction TB 3x ech direction Green ball 3x per direciton  Green ball 5x Green ball 3x each    [employer] in palm     Tennis ball 3x TB 3x TB 3x       Rubber band extension       1x 1x Y web 2x10 Y web 2x10    Power web      Thumb flexion, and gripping red 20x Thumb flexion, and gripping green 30x Thumb flexion, and gripping green 30x Thumb flexion, and gripping green 30x    Pinch ring       R/R x1 RG 1x R/G x1                 Ther Activity             Pink sponge         gripping, thumb depress, thumb adduct                                                        Modalities             [facility] 5' 5' 5' 5' 5' 5' 5' 5' 5'

## 2022-06-08 ENCOUNTER — OFFICE VISIT (OUTPATIENT)
Dept: OCCUPATIONAL THERAPY | Facility: CLINIC | Age: 42
End: 2022-06-08
Payer: OTHER MISCELLANEOUS

## 2022-06-08 DIAGNOSIS — M79.642 LEFT HAND PAIN: ICD-10-CM

## 2022-06-08 PROCEDURE — 97530 THERAPEUTIC ACTIVITIES: CPT | Performed by: OCCUPATIONAL THERAPIST

## 2022-06-08 PROCEDURE — 97140 MANUAL THERAPY 1/> REGIONS: CPT | Performed by: OCCUPATIONAL THERAPIST

## 2022-06-08 PROCEDURE — 97110 THERAPEUTIC EXERCISES: CPT | Performed by: OCCUPATIONAL THERAPIST

## 2022-06-08 NOTE — PROGRESS NOTES
Daily Note     Today's date: 2022  Patient name: Jany Thompson  : 1980  MRN: [id number]  Referring provider: Luis Cruz DO  Dx:   Encounter Diagnosis     ICD-10-CM    1  Left hand pain  M79 642 Ambulatory Referral to PT/OT Hand Therapy                  Subjective: "it feels tight"      Objective: See treatment diary below      Assessment: Demonstrates full ROM of the thumb and digits  Initiated further strengthening PREs today  Fatigued post        Plan: Progress treatment as tolerated  Continued focus on strengthening        Precautions: Universal      Manuals    IASTM  Thenar, 8' thenar and volar wrist, 8' 8' MCP and IP of thumb 8' MP/IP thumb web space 8' 8' 8' 8' thenar eminence 8' thenar eminence   KT    Along extensor and around MCP of thumb Along extensor and around MCP of thumb                                  Neuro Re-Ed                                                                                                        Ther Ex             Edema glove Size XS            HEP: PROM, place and holds, tendon gliding, wrist stretching         AROM/PROM thumb    Wrist stretching             Place and holds  5x           FDS/FDP gliding             PROM thumb/wrist         x4 min    Progressive grasp  pencils 2x pencils 2x pencils 2x pencils 2x        Digiflex  Red 20x per finger red 20x per finger red 20x per finger Red 30x each finger Green 30x each digit Green 30x each digit Green 30x each digit  Green 30x each digit   Wrist maze  10x           Keypegs  1x alternating digits 1x alternating digits in and out 1x alternating digits in and out 1x opp in and out 1x opp in and out 1x with 1 marble 1x w/ 1marble 1x w/ marble    Coordination balls   1 5 mins 1 5 mins 2'        Wall walking  Tennis ball, 3x per direction Tennis ball, 3x per direction Tennis ball, 3x per direction TB 3x ech direction TB 3x ech direction Green ball 3x per [employer] ball 5x Green ball 3x each Green ball 5x   [employer] in palm     Tennis ball 3x TB 3x TB 3x       Rubber band extension       1x 1x Y web 2x10 Y web 2x10 Yellow web 3x10   Power web      Thumb flexion, and gripping red 20x Thumb flexion, and gripping green 30x Thumb flexion, and gripping green 30x Thumb flexion, and gripping green 30x Thumb flexion, and gripping green 30x   Pinch ring       R/R x1 RG 1x R/G x1    Flex bar           F/E red 20x   Ther Activity             Pink sponge         gripping, thumb depress, thumb adduct    Pegs           Into board with red, out with 4th gripper    Puttycize twisting tools           #1, #3 in orange, 10/10                             Modalities             [facility] 5' 5' 5' 5' 5' 5' 5' 5' 5' 5'

## 2022-06-16 ENCOUNTER — OFFICE VISIT (OUTPATIENT)
Dept: OCCUPATIONAL THERAPY | Facility: CLINIC | Age: 42
End: 2022-06-16
Payer: OTHER MISCELLANEOUS

## 2022-06-16 DIAGNOSIS — M79.642 LEFT HAND PAIN: Primary | ICD-10-CM

## 2022-06-16 PROCEDURE — 97110 THERAPEUTIC EXERCISES: CPT | Performed by: OCCUPATIONAL THERAPIST

## 2022-06-16 PROCEDURE — 97530 THERAPEUTIC ACTIVITIES: CPT | Performed by: OCCUPATIONAL THERAPIST

## 2022-06-16 NOTE — PROGRESS NOTES
Daily Note     Today's date: 2022  Patient name: Nnamdi Gates  : 1980  MRN: [id number]  Referring provider: Yan Winslow DO  Dx:   Encounter Diagnosis     ICD-10-CM    1  Left hand pain  M79 642                   Subjective: "it felt a little tight" referring to earlier today when she was working  Objective: See treatment diary below      Assessment: Most challenged with gripping activities due to fatigue and lack of endurance  Plan: Progress treatment as tolerated  Continued focus on strengthening  Re-assess  strength next visit        Precautions: Universal      Manuals    IASTM  Thenar, 8' thenar and volar wrist, 8' 8' MCP and IP of thumb 8' MP/IP thumb web space 8' 8' 8' 8' thenar eminence 8' thenar eminence   KT    Along extensor and around MCP of thumb Along extensor and around MCP of thumb                                  Neuro Re-Ed                                                                                                        Ther Ex             Edema glove             HEP: PROM, place and holds, tendon gliding, wrist stretching         AROM/PROM thumb    Wrist stretching             Place and holds  5x           FDS/FDP gliding             PROM thumb/wrist         x4 min    Progressive grasp  pencils 2x pencils 2x pencils 2x pencils 2x        Digiflex Green 30x each digit Red 20x per finger red 20x per finger red 20x per finger Red 30x each finger Green 30x each digit Green 30x each digit Green 30x each digit  Green 30x each digit   Wrist maze  10x           Keypegs  1x alternating digits 1x alternating digits in and out 1x alternating digits in and out 1x opp in and out 1x opp in and out 1x with 1 marble 1x w/ 1marble 1x w/ marble    Coordination balls   1 5 mins 1 5 mins 2'        Wall walking Green ball 5x Tennis ball, 3x per direction Tennis ball, 3x per direction Tennis ball, 3x per direction TB 3x ech direction TB 3x ech direction Green ball 3x per direciton  Green ball 5x Green ball 3x each Green ball 5x   [employer] in palm     Tennis ball 3x TB 3x TB 3x       Rubber band extension  Yellow web 3x10     1x 1x Y web 2x10 Y web 2x10 Yellow web 3x10   Power web Thumb flexion, and gripping green 30x     Thumb flexion, and gripping red 20x Thumb flexion, and gripping green 30x Thumb flexion, and gripping green 30x Thumb flexion, and gripping green 30x Thumb flexion, and gripping green 30x   Pinch ring       R/R x1 RG 1x R/G x1    Flex bar  F/E green 20x         F/E red 20x   Ther Activity             Pink sponge         gripping, thumb depress, thumb adduct    Pegs  In and out with green         Into board with red, out with 4th gripper    Puttycize twisting tools  #1, #3 in orange, 10/10         #1, #3 in orange, 10/10   Sustained grasp Level 3, 1 5#, 30 seconds, 3x                         Modalities             P 5' 5' 5' 5' 5' 5' 5' 5' 5' 5'

## 2022-06-20 ENCOUNTER — OFFICE VISIT (OUTPATIENT)
Dept: OCCUPATIONAL THERAPY | Facility: CLINIC | Age: 42
End: 2022-06-20
Payer: OTHER MISCELLANEOUS

## 2022-06-20 DIAGNOSIS — M79.642 LEFT HAND PAIN: Primary | ICD-10-CM

## 2022-06-20 PROCEDURE — 97530 THERAPEUTIC ACTIVITIES: CPT | Performed by: OCCUPATIONAL THERAPIST

## 2022-06-20 PROCEDURE — 97110 THERAPEUTIC EXERCISES: CPT | Performed by: OCCUPATIONAL THERAPIST

## 2022-06-20 NOTE — PROGRESS NOTES
Daily Note     Today's date: 2022  Patient name: Arash Irene  : 1980  MRN: [id number]  Referring provider: Carmelina Mendoza DO  Dx:   Encounter Diagnosis     ICD-10-CM    1  Left hand pain  M79 642                   Subjective: She reports generalized thumb and hand pain  Objective: See treatment diary below  : L=21 4, R=31  3 point pinch: L= 5 4, R=3 9  Lateral pinch: L=5 0, R=3 1    Assessment: Demonstrates good improvement in  strength measurements from re-evaluation (was 8 pounds, today 21 4 pounds)  Able to upgrade resistive exercises, but still experiences fatigue  No stiffness occurring  Plan: Returning to doctor next week  Will continue HEP independently during this time and will return as needed        Precautions: Universal      Manuals    IASTM   thenar and volar wrist, 8' 8' MCP and IP of thumb 8' MP/IP thumb web space 8' 8' 8' 8' thenar eminence 8' thenar eminence   KT    Along extensor and around MCP of thumb Along extensor and around MCP of thumb                                  Neuro Re-Ed                                                                                                        Ther Ex             Edema glove             HEP: PROM, place and holds, tendon gliding, wrist stretching         AROM/PROM thumb    Wrist stretching             Place and holds             FDS/FDP gliding             PROM thumb/wrist         x4 min    Progressive grasp   pencils 2x pencils 2x pencils 2x        Digiflex Green 30x each digit Blue 30x each digit  red 20x per finger red 20x per finger Red 30x each finger Green 30x each digit Green 30x each digit Green 30x each digit  Green 30x each digit   Wrist maze             Ke[employer]   1x alternating digits in and out 1x alternating digits in and out 1x opp in and out 1x opp in and out 1x with 1 marble 1x w/ 1marble 1x w/ marble    Coordination balls   1 5 mins 1 5 mins 2'        Wall walking Green ball 5x Red ball 5x Tennis ball, 3x per direction Tennis ball, 3x per direction TB 3x ech direction TB 3x ech direction Green ball 3x per direciton  Green ball 5x Green ball 3x each Green ball 5x   [employer] in palm     Tennis ball 3x TB 3x TB 3x       Rubber band extension  Yellow web 3x10 Orange web 3x10    1x 1x Y web 2x10 Y web 2x10 Yellow web 3x10   Power web Thumb flexion, and gripping green 30x Thumb flexion, and gripping blue 30x    Thumb flexion, and gripping red 20x Thumb flexion, and gripping green 30x Thumb flexion, and gripping green 30x Thumb flexion, and gripping green 30x Thumb flexion, and gripping green 30x   Pinch ring       R/R x1 RG 1x R/G x1    Flex bar  F/E green 20x F/E green 20x        F/E red 20x   Ther Activity             Pink sponge         gripping, thumb depress, thumb adduct    Pegs  In and out with green In and out with green        Into board with red, out with 4th gripper    Puttycize twisting tools  #1, #3 in orange, 10/10 #1, #3 in orange, 10/10        #1, #3 in orange, 10/10   Sustained grasp Level 3, 1 5#, 30 seconds, 3x Level 4, 1 5#, 30 seconds, 3x                        Modalities             P 5' 5' 5' 5' 5' 5' 5' 5' 5' 5'

## 2022-08-01 ENCOUNTER — ANNUAL EXAM (OUTPATIENT)
Dept: OBGYN CLINIC | Facility: CLINIC | Age: 42
End: 2022-08-01
Payer: COMMERCIAL

## 2022-08-01 VITALS
DIASTOLIC BLOOD PRESSURE: 74 MMHG | BODY MASS INDEX: 23.3 KG/M2 | WEIGHT: 145 LBS | HEIGHT: 66 IN | SYSTOLIC BLOOD PRESSURE: 116 MMHG

## 2022-08-01 DIAGNOSIS — Z01.419 ENCOUNTER FOR GYNECOLOGICAL EXAMINATION WITHOUT ABNORMAL FINDING: Primary | ICD-10-CM

## 2022-08-01 DIAGNOSIS — Z12.31 ENCOUNTER FOR SCREENING MAMMOGRAM FOR BREAST CANCER: ICD-10-CM

## 2022-08-01 PROCEDURE — G0145 SCR C/V CYTO,THINLAYER,RESCR: HCPCS | Performed by: PHYSICIAN ASSISTANT

## 2022-08-01 PROCEDURE — 0503F POSTPARTUM CARE VISIT: CPT | Performed by: PHYSICIAN ASSISTANT

## 2022-08-01 PROCEDURE — 99396 PREV VISIT EST AGE 40-64: CPT | Performed by: PHYSICIAN ASSISTANT

## 2022-08-01 PROCEDURE — G0476 HPV COMBO ASSAY CA SCREEN: HCPCS | Performed by: PHYSICIAN ASSISTANT

## 2022-08-01 NOTE — PROGRESS NOTES
Assessment/Plan:    No problem-specific Assessment & Plan notes found for this encounter  Diagnoses and all orders for this visit:    Encounter for gynecological examination without abnormal finding  -     Liquid-based pap, screening    Encounter for screening mammogram for breast cancer  -     Mammo screening bilateral w 3d & cad; Future        Pap done  Order for mammogram for 2023 entered  Call if periods worsen or change  If no problems, patient to return in 1 year for routine gyn care  Subjective:      Patient ID: Alli Schroeder is a 38 y o  female  Patient is here for yearly gyn exam   States she is doing well overall  Periods are regular once a month, and bleeding lasts for 6-7 days  Flow can be heavy for the first 2-3 days  Declines hormonal contraception at this time  Patient denies bowel/bladder changes, pelvic pain, bloating, abdominal pain, n/v, change in appetite, and thyroid disease  Mammogram in May was benign  Patient is not performing self-breast exam   Denies new masses, skin changes, nipple discharge, and pain/tenderness  The following portions of the patient's history were reviewed and updated as appropriate: allergies, current medications, past family history, past medical history, past social history, past surgical history and problem list     Review of Systems   Constitutional: Negative for appetite change and unexpected weight change  Cardiovascular:        No masses, skin changes, nipple discharge, and pain/tenderness  Gastrointestinal: Negative for abdominal distention, abdominal pain, constipation, diarrhea, nausea and vomiting  Genitourinary: Negative for difficulty urinating, dysuria, frequency, genital sores, hematuria, menstrual problem, pelvic pain, urgency, vaginal bleeding, vaginal discharge and vaginal pain           Objective:      /74 (BP Location: Left arm, Patient Position: Sitting, Cuff Size: Adult)   Ht 5' 6" (1 676 m)   Wt 65 8 kg (145 lb)   LMP 07/27/2022   BMI 23 40 kg/m²          Physical Exam  Vitals reviewed  Exam conducted with a chaperone present  Constitutional:       Appearance: Normal appearance  She is well-developed and normal weight  Neck:      Thyroid: No thyromegaly  Pulmonary:      Effort: Pulmonary effort is normal    Chest:   Breasts: Breasts are symmetrical       Right: Normal  No swelling, bleeding, inverted nipple, mass, nipple discharge, skin change, tenderness, axillary adenopathy or supraclavicular adenopathy  Left: Normal  No swelling, bleeding, inverted nipple, mass, nipple discharge, skin change, tenderness, axillary adenopathy or supraclavicular adenopathy  Abdominal:      General: Abdomen is flat  There is no distension  Palpations: Abdomen is soft  Tenderness: There is no abdominal tenderness  Genitourinary:     General: Normal vulva  Pubic Area: No rash  Labia:         Right: No rash, tenderness, lesion or injury  Left: No rash, tenderness, lesion or injury  Vagina: Normal  No vaginal discharge, erythema, tenderness or bleeding  Cervix: Normal       Uterus: Normal        Adnexa: Right adnexa normal and left adnexa normal         Right: No mass, tenderness or fullness  Left: No mass, tenderness or fullness  Musculoskeletal:      Cervical back: Neck supple  Lymphadenopathy:      Cervical: No cervical adenopathy  Upper Body:      Right upper body: No supraclavicular or axillary adenopathy  Left upper body: No supraclavicular or axillary adenopathy  Lower Body: No right inguinal adenopathy  No left inguinal adenopathy  Skin:     General: Skin is warm and dry  Neurological:      Mental Status: She is alert and oriented to person, place, and time  Psychiatric:         Mood and Affect: Mood normal          Behavior: Behavior normal  Behavior is cooperative  Thought Content:  Thought content normal          Judgment: Judgment normal

## 2022-08-05 LAB
LAB AP GYN PRIMARY INTERPRETATION: NORMAL
Lab: NORMAL

## 2022-11-28 ENCOUNTER — OFFICE VISIT (OUTPATIENT)
Dept: OBGYN CLINIC | Facility: CLINIC | Age: 42
End: 2022-11-28

## 2022-11-28 VITALS — BODY MASS INDEX: 23.3 KG/M2 | HEIGHT: 66 IN | WEIGHT: 145 LBS

## 2022-11-28 DIAGNOSIS — G56.02 CARPAL TUNNEL SYNDROME ON LEFT: Primary | ICD-10-CM

## 2022-11-28 NOTE — PROGRESS NOTES
Assessment:  1  Carpal tunnel syndrome on left  Cock Up Wrist Splint    EMG 1 Limb        Patient Active Problem List   Diagnosis   • Acute bilateral low back pain without sciatica   • Strain of lumbar region   • Left hand pain       Plan:    38 y o  female  with left thumb numbness and tingling weakness with positive provocative testing for carpal tunnel syndrome    Cock-up wrist brace will be given to the patient  Encourage patient to resume her hand therapy exercises that she has from her therapist  Encourage B vitamin supplementation with B complex  EMG of the left upper extremity will be ordered at this time to evaluate for any he neuropathy at the wrist also to evaluate the AIN more specifically as well  As previously noted suspect she may have had a nerve contusion from her document a workplace injury this may be exacerbated recently from work or repetitive motion  Will give her temporary restrictions to allow for the brace and home exercise program to give her some relief   She will follow-up after EMG of the left upper extremity is performed for review      The assessment and plan were formulated by Dr Rehana Campbell and I assisted in carrying it out  Subjective:   Patient ID: Patience Aguilar is a 38 y o  female   Right-hand dominant    HPI    Patient presents to the office for follow up of left thumb pain and tingling  Since the last visit, the patient reports tingling sensation in thumb and on the radial aspect of the index finger  She notes a cooling sensation at times as well  She reports the symptoms began again about a week ago these are same symptoms that she had in the past   She reports the symptoms does not wake her up at nighttime     It is made worse with overactivity in use of the hand and better with rest   Reports weakness of the thumb as well       The following portions of the patient's history were reviewed and updated as appropriate: allergies, current medications, past family history, past social history, past surgical history and problem list     Social History     Socioeconomic History   • Marital status: Single     Spouse name: Not on file   • Number of children: Not on file   • Years of education: Not on file   • Highest education level: Not on file   Occupational History   • Not on file   Tobacco Use   • Smoking status: Never   • Smokeless tobacco: Never   Vaping Use   • Vaping Use: Never used   Substance and Sexual Activity   • Alcohol use: Yes     Comment: socailly    • Drug use: Never   • Sexual activity: Not Currently     Partners: Male   Other Topics Concern   • Not on file   Social History Narrative   • Not on file     Social Determinants of Health     Financial Resource Strain: Not on file   Food Insecurity: Not on file   Transportation Needs: Not on file   Physical Activity: Not on file   Stress: Not on file   Social Connections: Not on file   Intimate Partner Violence: Not on file   Housing Stability: Not on file     Past Medical History:   Diagnosis Date   • Fibroid      Past Surgical History:   Procedure Laterality Date   •  SECTION     • HAND SURGERY       No Known Allergies  Current Outpatient Medications on File Prior to Visit   Medication Sig Dispense Refill   • methocarbamol (ROBAXIN) 500 mg tablet Take 1 tablet (500 mg total) by mouth 2 (two) times a day as needed for muscle spasms (Patient not taking: Reported on 2022) 60 tablet 1   • naproxen (NAPROSYN) 500 mg tablet Take 500 mg by mouth 2 (two) times a day as needed (Patient not taking: Reported on 2022)       No current facility-administered medications on file prior to visit  Review of Systems  See HPi    Objective: There were no vitals filed for this visit      Physical Exam    Right Hand Exam     Comments:  Normal strength      Left Hand Exam     Range of Motion   Wrist   Extension: normal   Flexion: normal   Pronation: normal   Supination: normal     Muscle Strength   The patient has normal left wrist strength  Tests   Phalen’s sign: positive  Tinel's sign (median nerve): positive    Other   Erythema: absent  Scars: absent  Left hand sensation: Decreased over the thumb not the index or long fingers  Pulse: present (Radial pulses symmetric bilaterally)    Comments:  No visible deformity, no ecchymosis  Full ROM at all MCP and IP joints  3/5 strength APB , 3/5 strength AIN  Positive scratch collapse test            I have personally reviewed pertinent films in PACS  Procedures  No Procedures performed today        Portions of the record may have been created with voice recognition software  Occasional wrong word or "sound a like" substitutions may have occurred due to the inherent limitations of voice recognition software  Read the chart carefully and recognize, using context, where substitutions have occurred

## 2022-11-28 NOTE — LETTER
November 28, 2022     Patient: Chirag Isaac  YOB: 1980  Date of Visit: 11/28/2022      To Whom it May Concern:    Chirag Isaac is under my professional care  Darrell Herman was seen in my office on 11/28/2022  Darrell Herman may return to work on 11/28/22 with temporary restrictions: please allow her use of brace on L hand during work  No repetitive use of left hand or lifting over 5 lbs with left hand until 1/4/23  Her restrictions will be re-evaluated on or around 1/4/23  If you have any questions or concerns, please don't hesitate to call           Sincerely,          Griffin Rose DO        CC: No Recipients

## 2022-12-01 ENCOUNTER — TELEPHONE (OUTPATIENT)
Dept: OBGYN CLINIC | Facility: MEDICAL CENTER | Age: 42
End: 2022-12-01

## 2022-12-01 NOTE — TELEPHONE ENCOUNTER
Caller: Patient    Doctor: Pepper Larson    Reason for call:    Patient was given a brace for her left hand, she is stating she feels a pulling in her arm (painful) her thumb is numb  She is asking if there a different type of brace or what else the doctor can recommend      Call back#: [phone]

## 2023-02-08 ENCOUNTER — PROCEDURE VISIT (OUTPATIENT)
Dept: NEUROLOGY | Facility: CLINIC | Age: 43
End: 2023-02-08

## 2023-02-08 DIAGNOSIS — G56.02 CARPAL TUNNEL SYNDROME ON LEFT: ICD-10-CM

## 2023-02-13 ENCOUNTER — OFFICE VISIT (OUTPATIENT)
Dept: OBGYN CLINIC | Facility: CLINIC | Age: 43
End: 2023-02-13

## 2023-02-13 VITALS
DIASTOLIC BLOOD PRESSURE: 73 MMHG | SYSTOLIC BLOOD PRESSURE: 114 MMHG | HEIGHT: 66 IN | BODY MASS INDEX: 22.34 KG/M2 | HEART RATE: 64 BPM | WEIGHT: 139 LBS

## 2023-02-13 DIAGNOSIS — M54.2 NECK PAIN: ICD-10-CM

## 2023-02-13 DIAGNOSIS — M79.642 LEFT HAND PAIN: Primary | ICD-10-CM

## 2023-02-13 NOTE — PROGRESS NOTES
Assessment:  1  Left hand pain  Ambulatory Referral to Hand Surgery      2  Neck pain  Ambulatory Referral to Pain Management        Patient Active Problem List   Diagnosis   • Acute bilateral low back pain without sciatica   • Strain of lumbar region   • Left hand pain           Plan      38 y o  female with chronic left hand pain, numbness, and tingling  • EMG does not demonstrate evidence of carpal tunnel syndrome  • Referral placed today for patient to follow up with Orthopedic Hand Surgeon for further evaluation  • Note provided for patient to continue her light duty restrictions until she is evaluated by Hand Surgeon  • Patient also describes a neck pain not related to workplace injury or hand symptoms  • Referral placed today for patient to follow up with Spine and Pain for further evaluation and treatment of her neck pain  Return for follow up on an as-needed basis (PRN)  Subjective:     Patient ID: Alejandrina Munroe 38 y o  female    HPI    Patient comes in today for follow up evaluation of ongoing left hand pain that began one year ago after a locker fell onto her left hand while she was performing her work duties as a   She describes pain, numbness, and tingling of the left hand volar aspect between the 1st and 2nd digits  She also notes that it feels cold at times  Workplace injury initially occurred one year ago, however her pain improved with PT, thumb spica bracing at night, and returned approximately two months ago  She also notes cervical spine pain that began one month ago with no mechanism of injury or trauma  The following portions of the patient's history were reviewed and updated as appropriate: allergies, current medications, past family history, past social history, past surgical history and problem list       Objective:    Review of Systems  Pertinent items are noted in HPI  All other systems were reviewed and are negative      Past Medical History:   Diagnosis Date   • Fibroid        Past Surgical History:   Procedure Laterality Date   •  SECTION     • HAND SURGERY         Family History   Problem Relation Age of Onset   • Fibroids Sister    • No Known Problems Mother    • No Known Problems Father    • No Known Problems Daughter    • Breast cancer Maternal Grandmother 61   • No Known Problems Maternal Grandfather    • No Known Problems Paternal Grandmother    • No Known Problems Paternal Grandfather    • No Known Problems Sister    • No Known Problems Son    • No Known Problems Maternal Aunt    • No Known Problems Maternal Aunt    • No Known Problems Paternal Aunt        Social History     Occupational History   • Not on file   Tobacco Use   • Smoking status: Never   • Smokeless tobacco: Never   Vaping Use   • Vaping Use: Never used   Substance and Sexual Activity   • Alcohol use: Yes     Comment: socailly    • Drug use: Never   • Sexual activity: Not Currently     Partners: Male         Current Outpatient Medications:   •  methocarbamol (ROBAXIN) 500 mg tablet, Take 1 tablet (500 mg total) by mouth 2 (two) times a day as needed for muscle spasms (Patient not taking: Reported on 2022), Disp: 60 tablet, Rfl: 1  •  naproxen (NAPROSYN) 500 mg tablet, Take 500 mg by mouth 2 (two) times a day as needed (Patient not taking: Reported on 2022), Disp: , Rfl:     No Known Allergies    Physical Exam  /73   Pulse 64   Ht 5' 6" (1 676 m)   Wt 63 kg (139 lb)   BMI 22 44 kg/m²   Cons: Appears well  No apparent distress  Psych: Alert  Oriented x3  Mood and affect normal   Eyes: PERRLA, EOMI  Resp: Normal effort  No audible wheezing or stridor  CV: Palpable pulse  No discernable arrhythmia  No LE edema  Lymph:  No palpable cervical, axillary, or inguinal lymphadenopathy  Skin: Warm  No palpable masses  No visible lesions  Neuro: Normal muscle tone  Normal and symmetric DTR's  Ortho Exam    Left Carpal Tunnel Exam:    Negative thenar atrophy  Negative phalen's test  Negative carpal tunnel compression  Negative tinels over median nerve at the wrist   Opposition strength 5/5  Abduction strength 5/5  Range of motion: wrist normal, able to achieve composite fist, able to actively flex and extend all digits, able to achieve opposition to 5th proximal phalanx  Strength: normal  No observable deformity, edema, ecchymosis, atrophy  Pulse present and normal  Sensation intact to Ax/R/M/U nerve distributions      Procedures  No Procedures performed today      EMG obtained 2/8/2023 demonstrates no evidence of interosseous neuropathy, median neuropathy, or cervical radiculopathy  Scribe Attestation    I,:  Juarez Au am acting as a scribe while in the presence of the attending physician :       I,:  Betty Church DO personally performed the services described in this documentation    as scribed in my presence :              Portions of the record may have been created with voice recognition software  Occasional wrong word or "sound a like" substitutions may have occurred due to the inherent limitations of voice recognition software  Read the chart carefully and recognize, using context, where substitutions have occurred

## 2023-02-13 NOTE — LETTER
February 13, 2023     Patient: Lauren Gracia  YOB: 1980  Date of Visit: 2/13/2023      To Whom it May Concern:    Lauren Gracia is under my professional care  Camille Wu was seen in my office on 2/13/2023  Camille Wu may return to work on 2/13/23 with temporary restrictions: please allow her use of brace on L hand during work  No repetitive use of left hand or lifting over 5 lbs with left hand until evaluated with Orthopedic Hand Surgeon  If you have any questions or concerns, please don't hesitate to call           Sincerely,          Asim Quezada DO        CC: No Recipients

## 2023-03-14 ENCOUNTER — OFFICE VISIT (OUTPATIENT)
Dept: OBGYN CLINIC | Facility: CLINIC | Age: 43
End: 2023-03-14

## 2023-03-14 VITALS
SYSTOLIC BLOOD PRESSURE: 103 MMHG | HEIGHT: 66 IN | DIASTOLIC BLOOD PRESSURE: 70 MMHG | WEIGHT: 142.8 LBS | HEART RATE: 69 BPM | BODY MASS INDEX: 22.95 KG/M2

## 2023-03-14 DIAGNOSIS — G56.02 CARPAL TUNNEL SYNDROME ON LEFT: Primary | ICD-10-CM

## 2023-03-14 RX ORDER — BETAMETHASONE SODIUM PHOSPHATE AND BETAMETHASONE ACETATE 3; 3 MG/ML; MG/ML
6 INJECTION, SUSPENSION INTRA-ARTICULAR; INTRALESIONAL; INTRAMUSCULAR; SOFT TISSUE
Status: COMPLETED | OUTPATIENT
Start: 2023-03-14 | End: 2023-03-14

## 2023-03-14 RX ORDER — BUPIVACAINE HYDROCHLORIDE 2.5 MG/ML
1 INJECTION, SOLUTION INFILTRATION; PERINEURAL
Status: COMPLETED | OUTPATIENT
Start: 2023-03-14 | End: 2023-03-14

## 2023-03-14 RX ADMIN — BETAMETHASONE SODIUM PHOSPHATE AND BETAMETHASONE ACETATE 6 MG: 3; 3 INJECTION, SUSPENSION INTRA-ARTICULAR; INTRALESIONAL; INTRAMUSCULAR; SOFT TISSUE at 11:54

## 2023-03-14 RX ADMIN — BUPIVACAINE HYDROCHLORIDE 1 ML: 2.5 INJECTION, SOLUTION INFILTRATION; PERINEURAL at 11:54

## 2023-03-14 NOTE — PROGRESS NOTES
ORTHOPAEDIC HAND, WRIST, AND ELBOW OFFICE  VISIT       ASSESSMENT/PLAN:      Diagnoses and all orders for this visit:    Carpal tunnel syndrome on left  -     Ambulatory Referral to Hand Surgery  -     Ambulatory Referral to PT/OT Hand Therapy; Future  -     Hand/upper extremity injection: L carpal tunnel  -     bupivacaine (MARCAINE) 0 25 % injection 1 mL  -     betamethasone acetate-betamethasone sodium phosphate (CELESTONE) injection 10mg      44-year-old female with left hand numbness and tingling    I discussed with the patient that she may have some component of left carpal tunnel syndrome which did not show up on EMG  Also discussed she may also have a pinched nerve in her neck  Treatment options were discussed in the form formal therapy and a steroid injection  The injection can be both diagnostic and therapeutic  The patient was agreeable to this  She consented and underwent a left carpal tunnel injection in the office today without any complications  A referral was provided to OT for range of motion and strengthening  The patient verbalized understanding of exam findings and treatment plan  We engaged in the shared decision-making process and treatment options were discussed at length with the patient  Surgical and conservative management discussed today along with risks and benefits  Follow Up:  2 months       To Do Next Visit:  Re-evaluation of current issue    General Discussions:  Carpal Tunnel Syndrome: The anatomy and physiology of carpal tunnel syndrome was discussed with the patient today  Increase pressure localized under the transverse carpal ligament can cause pain, numbness, tingling, or dysesthesias within the median nerve distribution as well as feelings of fatigue, clumsiness, or awkwardness  These symptoms typically occur at night and worse in the morning upon waking    Eventually, untreated carpal tunnel syndrome can result in weakness and permanent loss of muscle within the thenar compartment of the hand  Treatment options were discussed with the patient  Conservative treatment includes nocturnal resting splints to keep the nerve in a neutral position, ergonomic changes within the work or home environment, activity modification, and tendon gliding exercises  Vitamin B6 one tablet daily over the counter may helpful to reduce symptoms  Steroid injections within the carpal canal can help a majority of patients, however this is often self-limited in a majority of patients  Surgical intervention to divide the transverse carpal ligament typically results in a long-lasting relief of the patient's complaints, with the recurrence rate of less than 1%  Elkin Cantrell MD  Attending, Orthopaedic Surgery  Hand, Wrist, and Elbow Surgery  [facility]    ____________________________________________________________________________________________________________________________________________      CHIEF COMPLAINT:  Chief Complaint   Patient presents with   • Left Hand - Pain       SUBJECTIVE:  Herminio Duff is a 38 y o  RHD female who presents today for evaluation and treatment of left hand pain  The patient is referred by Flavio Kaufman DO  The patient states in 2/25/22 she was at work as a  when a case fell onto her left hand  She was evaluated by Dr Suly Mojica and underwent x-ray's, MRI, and a EMG  She also attended formal therapy which she states initially did provide her with some relief  She notes pain between her left thumb and index finger  She notes increased pain with lifting  She also notes difficulty and stiffness with thumb abduction  She states for the past 2 months she has also been experiencing neck pain   She notes constant numbness and tingling to the tips of her fingers  She states this is worse with activity  She has tried night time bracing without relief  She also notes a coldness about her hand  She is currently working as a   I have personally reviewed all the relevant PMH, PSH, SH, FH, Medications and allergies      PAST MEDICAL HISTORY:  Past Medical History:   Diagnosis Date   • Fibroid        PAST SURGICAL HISTORY:  Past Surgical History:   Procedure Laterality Date   •  SECTION     • HAND SURGERY         FAMILY HISTORY:  Family History   Problem Relation Age of Onset   • Fibroids Sister    • No Known Problems Mother    • No Known Problems Father    • No Known Problems Daughter    • Breast cancer Maternal Grandmother 61   • No Known Problems Maternal Grandfather    • No Known Problems Paternal Grandmother    • No Known Problems Paternal Grandfather    • No Known Problems Sister    • No Known Problems Son    • No Known Problems Maternal Aunt    • No Known Problems Maternal Aunt    • No Known Problems Paternal Aunt        SOCIAL HISTORY:  Social History     Tobacco Use   • Smoking status: Never   • Smokeless tobacco: Never   Vaping Use   • Vaping Use: Never used   Substance Use Topics   • Alcohol use: Yes     Comment: socailly    • Drug use: Never       MEDICATIONS:    Current Outpatient Medications:   •  methocarbamol (ROBAXIN) 500 mg tablet, Take 1 tablet (500 mg total) by mouth 2 (two) times a day as needed for muscle spasms (Patient not taking: Reported on 2022), Disp: 60 tablet, Rfl: 1  •  naproxen (NAPROSYN) 500 mg tablet, Take 500 mg by mouth 2 (two) times a day as needed (Patient not taking: Reported on 2022), Disp: , Rfl:   No current facility-administered medications for this visit  ALLERGIES:  No Known Allergies        REVIEW OF SYSTEMS:  Musculoskeletal:        As noted in HPI  All other systems reviewed and are negative      VITALS:  Vitals:    23 1116   BP: 103/70   Pulse: 69       LABS:  HgA1c: No results found for: HGBA1C  BMP: No results found for: GLUCOSE, CALCIUM, NA, K, CO2, CL, BUN, CREATININE    _____________________________________________________  PHYSICAL EXAMINATION:  General: well developed and well nourished, alert, oriented times 3 and appears comfortable  Psychiatric: Normal  HEENT: Normocephalic, Atraumatic Trachea Midline, No torticollis  Pulmonary: No audible wheezing or respiratory distress   Abdomen/GI: Non tender, non distended   Cardiovascular: No pitting edema, 2+ radial pulse   Skin: No masses, erythema, lacerations, fluctation, ulcerations  Neurovascular: Sensation Intact to the Median, Ulnar, Radial Nerve, Motor Intact to the Median, Ulnar, Radial Nerve and Pulses Intact  Musculoskeletal: Normal, except as noted in detailed exam and in HPI  MUSCULOSKELETAL EXAMINATION:  Left hand  - tinel's at the wrist  Compartments soft  Brisk capillary refill     ___________________________________________________  STUDIES REVIEWED:  EMG was personally reviewed by Dr Bishop Gil and demonstrates normal EMG  No evidence of carpal tunnel  PROCEDURES PERFORMED:  Hand/upper extremity injection: L carpal tunnel  [place] Protocol:  Consent: Verbal consent obtained    Consent given by: patient  Patient identity confirmed: verbally with patient    Supporting Documentation  Indications: pain, diagnostic and therapeutic   Procedure Details  Condition:carpal tunnel syndrome Site: L carpal tunnel   Preparation: Patient was prepped and draped in the usual sterile fashion  Needle size: 25 G  Ultrasound guidance: no  Medications administered: 1 mL bupivacaine 0 25 %; 6 mg betamethasone acetate-betamethasone sodium phosphate 6 (3-3) mg/mL    Patient tolerance: patient tolerated the procedure well with no immediate complications  Dressing:  Sterile dressing applied              _____________________________________________________      Scribe Attestation    I,:  Beena Jackson MA am acting as a scribe while in the presence of the attending physician :       I,:  Basilia Reyes MD personally performed the services described in this documentation    as scribed in my presence :

## 2023-03-14 NOTE — LETTER
March 14, 2023     Patient: Alejandrina Munroe  YOB: 1980  Date of Visit: 3/14/2023      To Whom it May Concern:    Alejandrina Munroe is under my professional care  Monroe Oconnor was seen in my office on 3/14/2023  Monroe Oconnor may continue to work full duty with no restrictions  If you have any questions or concerns, please don't hesitate to call           Sincerely,          Carla Gregory MD

## 2023-03-23 ENCOUNTER — EVALUATION (OUTPATIENT)
Dept: OCCUPATIONAL THERAPY | Facility: CLINIC | Age: 43
End: 2023-03-23

## 2023-03-23 DIAGNOSIS — G56.02 LEFT CARPAL TUNNEL SYNDROME: Primary | ICD-10-CM

## 2023-03-23 NOTE — PROGRESS NOTES
OT Evaluation     Today's date: 3/23/2023  Patient name: Jay Paris  : 1980  MRN: [id number]  Referring provider: No ref  provider found  Dx:   Encounter Diagnosis     ICD-10-CM    1  Left carpal tunnel syndrome  G56 02                      Assessment  Assessment details: Patient presents with a diagnosis of left carpal tunnel syndrome  Patient symptoms began on 22 when their thumb was struck while working  Patient had previously seen hand therapy until 22  Patient followed up with orthopedics on 22 due to increase in symptoms and an EMG was scheduled  EMG displayed no acute findings  Patient was given a CSI injection into the carpal tunnel on 3/14/23 by orthopedics and reports that pain has decreased but still persists  Patient reported that in February they switched positions to being a  which has helped decrease symptoms  Patient presents with decreased wrist and 1st digit ROM, decreased /pinch strength, numbness and tingling in the L 1st-3rd digits, and increased pain during functional movement  Patient was provided a HEP and instructed on how to appropriately perform exercises  See below for a more detailed assessment     Impairments: abnormal coordination, abnormal or restricted ROM, activity intolerance, impaired physical strength and pain with function    Symptom irritability: lowUnderstanding of Dx/Px/POC: good   Prognosis: good    Goals  STGs:    Patient will increase ROM in wrist by 10-20 degrees in all planes in 4 weeks    Patient will increase ROM in 1st digit MP/IP by 10-20 degrees in 4 weeks    Patient will increase  strength by 5-10 lbs in 4 weeks    Patient will increase pinch strength by 3-5 lbs in 4 weeks     Patient will report decreased pain during functional movement by 1-2 grades in 4 weeks    Patient will report decreased numbness and tingling in digits 1-3 in 4 weeks    Patient will demonstrate an understanding of HEP by end of initial session    LTGs:    Patient will display wrist ROM WFL in 8 weeks or discharge    Patient will display digit ROM WFL in 8 weeks or discharge    Patient will increase  strength by 10-20 lbs in 8 weeks or discharge    Patient will improve pinch strength by 5-10 lbs in 8 weeks or discharge    Patient will report the resolution of numbness and tingling in digits 1-3 in 8 weeks or discharge    Patient will report resolution of pain symptoms during functional movement in 8 weeks or discharge      Plan  Plan details: Patient presenting to OP OT services due to a diagnosis of L carpal tunnel sydnrome  Patient would benefit from skilled occupational therapy services 2 times per week for 8 weeks to return to prior level of function and achieve all of their established goals  Thank you for the referral    Patient would benefit from: custom splinting, skilled occupational therapy and OT eval  Planned modality interventions: ultrasound, thermotherapy: hydrocollator packs and unattended electrical stimulation  Planned therapy interventions: IADL retraining, patient education, self care, manual therapy, orthotic fitting/training, strengthening, stretching, therapeutic activities, therapeutic exercise, home exercise program, functional ROM exercises and fine motor coordination training  Frequency: 2x week  Duration in visits: 10  Plan of Care beginning date: 3/23/2023  Plan of Care expiration date: 2023  Treatment plan discussed with: patient        Subjective Evaluation    History of Present Illness  Date of onset: 2023  Mechanism of injury: trauma  Mechanism of injury: Work Injury    Subjective:  "it just hasn't gotten better"  "It keeps hurting"  "I can't extend my thumb backwards at all compared to my right"             Recurrent probem    Quality of life: good    Pain  Current pain ratin  At best pain ratin  At worst pain rating: 10  Location: 1st digit and thenar eminaince  Quality: sharp and throbbing  Relieving factors: support  Aggravating factors: lifting  Progression: no change    Social Support    Employment status: working ()    Diagnostic Tests  X-ray: normal  EMG: normal  Treatments  Previous treatment: occupational therapy, medication and injection treatment  Current treatment: injection treatment and occupational therapy  Patient Goals  Patient goals for therapy: decreased pain, increased motion and increased strength          Objective     Tenderness     Right Wrist/Hand   No tenderness in the first dorsal compartment, second dorsal compartment and carpometacarpal joint  Neurological Testing     Sensation     Wrist/Hand   Left   Intact: light touch    Right   Intact: light touch    Active Range of Motion     Left Wrist   Wrist flexion: 75 degrees   Wrist extension: 75 degrees     Right Wrist   Wrist flexion: 65 degrees   Wrist extension: 65 degrees     Left Thumb   Flexion     MP: 50 degrees    DIP: 58 degrees  Palmar Abduction     CMC: 40 degrees  Radial abduction    CMC: 45 degrees  Opposition: Full opposition    Right Thumb   Flexion     MP: 64    DIP: 78  Palmar Abduction    CMC: 55  Radial Abduction    CMC: 55  Opposition: Full opposition     Additional Active Range of Motion Details  B/L full composite fist    Strength/Myotome Testing     Left Wrist/Hand   Normal wrist strength     (2nd hand position)     Trial 1: 11 4    Thumb Strength  Key/Lateral Pinch     Trial 1: 5  Tip/Two-Point Pinch     Trial 1: 2  Palmar/Three-Point Pinch     Trial 1: 4    Right Wrist/Hand   Normal wrist strength     (2nd hand position)     Trial 1: 49 6    Thumb Strength   Key/Lateral Pinch     Trial 1: 12  Tip/Two-Point Pinch     Trial 1: 8  Palmar/Three-Point Pinch     Trial 1: 10    Tests     Left Wrist/Hand   Positive Phalen's sign and Tinel's sign (medial nerve)  Negative CMC grind  Right Wrist/Hand   Negative CMC grind, Phalen's sign and Tinel's sign (medial nerve)  Swelling     Left Wrist/Hand   Thumb     Proximal: 5 5 cm    Distal: 5 3 cm  Circumference MCP: 16 cm  Circumference wrist: 13 2 cm    Right Wrist/Hand   Thumb     Proximal: 5 8 cm    Distal: 5 2 cm  Circumference MCP: 16 cm  Circumference wrist: 13 2 cm  Neuro Exam:     Functional outcomes   Left 9 peg hole test: 37 2 (seconds)  Right 9 hole peg test: 24 76 (seconds)             Precautions: Universal    Manuals 3/23/23            IASTM             taping             cupping                          Neuro Re-Ed             Median nerve glide                                                                                           Ther Ex             HEP Wrist AROM    Tendon glide    opposition              Wrist/Digit AROM             Opposition marble/abd             Wrist maze             TB on Wall             Wrist isometrics             Digiflex             Hand Power Pro             Ext Web             Ther Activity             keypegs             Colored pegs                                                    Modalities             [facility]

## 2023-03-29 ENCOUNTER — OFFICE VISIT (OUTPATIENT)
Dept: OCCUPATIONAL THERAPY | Facility: CLINIC | Age: 43
End: 2023-03-29

## 2023-03-29 DIAGNOSIS — G56.02 LEFT CARPAL TUNNEL SYNDROME: Primary | ICD-10-CM

## 2023-03-29 NOTE — PROGRESS NOTES
"Daily Note     Today's date: 3/29/2023  Patient name: Alice Levy  : 1980  MRN: [id number]  Referring provider: No ref  provider found  Dx:   Encounter Diagnosis     ICD-10-CM    1  Left carpal tunnel syndrome  G56 02                      Subjective: \"My thumb feels numb and fatigued\"  Objective: See treatment diary below      Assessment: Tolerated treatment well  Patient reported compliance to HEP and stated that they feel it is helping  Patient reported fatigue following exercises and activities in the 1st digit  Overall, patient tolerated all exercises and activities well  Therapist will add light resistive exercises as able once symptoms decrease  Plan: Continue per plan of care  Progress treatment as tolerated         Precautions: Universal    Manuals 3/23/23 3/29           IASTM  8' carpal tunnel           taping                                       Neuro Re-Ed             Median nerve glide                                                                                           Ther Ex             HEP Wrist AROM    Tendon glide    opposition              Wrist/Digit AROM  x10 each           Opposition marble/abd  x20 each           Wrist maze  x5           TB on Wall  x5           Wrist isometrics             Digiflex             Hand Power Pro             Ext Web             Ther Activity             keypegs  x1           Colored pegs                                                    Modalities             [facility]  5'                                 "

## 2023-03-31 ENCOUNTER — HOSPITAL ENCOUNTER (OUTPATIENT)
Dept: RADIOLOGY | Facility: HOSPITAL | Age: 43
Discharge: HOME | End: 2023-03-31
Attending: ANESTHESIOLOGY

## 2023-03-31 ENCOUNTER — OFFICE VISIT (OUTPATIENT)
Dept: PAIN MEDICINE | Facility: CLINIC | Age: 43
End: 2023-03-31

## 2023-03-31 ENCOUNTER — OFFICE VISIT (OUTPATIENT)
Dept: OCCUPATIONAL THERAPY | Facility: CLINIC | Age: 43
End: 2023-03-31

## 2023-03-31 VITALS
RESPIRATION RATE: 16 BRPM | SYSTOLIC BLOOD PRESSURE: 116 MMHG | DIASTOLIC BLOOD PRESSURE: 79 MMHG | HEIGHT: 66 IN | WEIGHT: 145 LBS | HEART RATE: 73 BPM | BODY MASS INDEX: 23.3 KG/M2

## 2023-03-31 DIAGNOSIS — M54.2 NECK PAIN: ICD-10-CM

## 2023-03-31 DIAGNOSIS — M79.18 MYOFASCIAL PAIN SYNDROME: ICD-10-CM

## 2023-03-31 DIAGNOSIS — G56.02 LEFT CARPAL TUNNEL SYNDROME: Primary | ICD-10-CM

## 2023-03-31 RX ORDER — METHOCARBAMOL 500 MG/1
500 TABLET, FILM COATED ORAL 2 TIMES DAILY PRN
Qty: 60 TABLET | Refills: 1 | Status: SHIPPED | OUTPATIENT
Start: 2023-03-31

## 2023-03-31 NOTE — PROGRESS NOTES
"Daily Note     Today's date: 3/31/2023  Patient name: Trey Montero  : 1980  MRN: [id number]  Referring provider: Helga Le MD  Dx:   Encounter Diagnosis     ICD-10-CM    1  Left carpal tunnel syndrome  G56 02                      Subjective: \"I felt really good after the last session, I didn't have any additional numbness or tingling after the session\"  Objective: See treatment diary below      Assessment: Tolerated treatment well  Patient reported decreased numbness and tingling this week compared to the prior  Patient fatigued following pinch rings and keypegs and was unable to finish the activity  Patient will alter exercises and activities as necessary  Plan: Continue per plan of care  Progress treatment as tolerated         Precautions: Universal    Manuals 3/23/23 3/29 3/31          IASTM  8' carpal tunnel 8' carpal tunnel          taping             Cupping   carpal tunnel                       Neuro Re-Ed             Median nerve glide                                                                                           Ther Ex             HEP Wrist AROM    Tendon glide    opposition              Wrist PROM   8'          Wrist/Digit AROM  x10 each           Opposition marble/abd  x20 each x20 each          Wrist maze  x5 x5          TB on Wall  x5 x5          Wrist isometrics             Digiflex   Y iso x 10            Hand Power Pro             Ext Web             Ther Activity             keypegs  x1 x1 half          Colored pegs             Pinch Rings   Y/R x1 half way                                    Modalities             [facility]  5' 5'                                "

## 2023-03-31 NOTE — PROGRESS NOTES
Assessment:  1  Neck pain    2  Myofascial pain syndrome        Plan:  The patient symptoms, history/physical are consistent with right-sided neck pain secondary to muscle spasms  At this time, I will order x-rays of the cervical spine to evaluate further  I advised her that I will also send her to physical therapy pending the results of the x-rays  For now, I will start her back on methocarbamol 500 mg twice daily as needed for spasms  My impressions and treatment recommendations were discussed in detail with the patient who verbalized understanding and had no further questions  Discharge instructions were provided  I personally saw and examined the patient and I agree with the above discussed plan of care  Orders Placed This Encounter   Procedures   • XR spine cervical complete 6+ vw flex/ext/obl     Right sided neck pain     Standing Status:   Future     Standing Expiration Date:   3/31/2027     Scheduling Instructions:      Bring along any outside films relating to this procedure  Order Specific Question:   When should the test be performed? Answer:   in 1 week     Order Specific Question:   Is the patient pregnant? Answer:   No   • Ambulatory referral to Physical Therapy     Standing Status:   Future     Standing Expiration Date:   3/31/2024     Referral Priority:   Routine     Referral Type:   Physical Therapy     Referral Reason:   Specialty Services Required     Requested Specialty:   Physical Therapy     Number of Visits Requested:   1     Expiration Date:   3/31/2024     New Medications Ordered This Visit   Medications   • methocarbamol (ROBAXIN) 500 mg tablet     Sig: Take 1 tablet (500 mg total) by mouth 2 (two) times a day as needed for muscle spasms     Dispense:  60 tablet     Refill:  1       History of Present Illness:  Shabbir Stevens is a 38 y o  female who presents for a follow up office visit in regards to Back Pain and Neck Pain     The patient reports that she recently aggravated her neck  She states that she is dealing with a work injury in which a letter case fell on her left hand  She reports the neck symptoms are worse to the right aggravated with turning her head in certain positions  Symptoms can be moderate to severe rated 8/10 and felt intermittently  I have personally reviewed and/or updated the patient's past medical history, past surgical history, family history, social history, current medications, allergies, and vital signs today  Review of Systems   Respiratory: Negative for shortness of breath  Cardiovascular: Negative for chest pain  Gastrointestinal: Negative for constipation, diarrhea, nausea and vomiting  Musculoskeletal: Positive for back pain, joint swelling and neck pain  Negative for arthralgias, gait problem and myalgias  Skin: Negative for rash  Neurological: Negative for dizziness, seizures and weakness  All other systems reviewed and are negative        Patient Active Problem List   Diagnosis   • Acute bilateral low back pain without sciatica   • Strain of lumbar region   • Left hand pain       Past Medical History:   Diagnosis Date   • Fibroid        Past Surgical History:   Procedure Laterality Date   •  SECTION     • HAND SURGERY         Family History   Problem Relation Age of Onset   • Fibroids Sister    • No Known Problems Mother    • No Known Problems Father    • No Known Problems Daughter    • Breast cancer Maternal Grandmother 61   • No Known Problems Maternal Grandfather    • No Known Problems Paternal Grandmother    • No Known Problems Paternal Grandfather    • No Known Problems Sister    • No Known Problems Son    • No Known Problems Maternal Aunt    • No Known Problems Maternal Aunt    • No Known Problems Paternal Aunt        Social History     Occupational History   • Not on file   Tobacco Use   • Smoking status: Never   • Smokeless tobacco: Never   Vaping Use   • Vaping Use: Never used   Substance and "Sexual Activity   • Alcohol use: Yes     Comment: socailly    • Drug use: Never   • Sexual activity: Not Currently     Partners: Male       Current Outpatient Medications on File Prior to Visit   Medication Sig   • naproxen (NAPROSYN) 500 mg tablet Take 500 mg by mouth 2 (two) times a day as needed (Patient not taking: Reported on 11/28/2022)   • [DISCONTINUED] methocarbamol (ROBAXIN) 500 mg tablet Take 1 tablet (500 mg total) by mouth 2 (two) times a day as needed for muscle spasms (Patient not taking: Reported on 11/28/2022)     No current facility-administered medications on file prior to visit  No Known Allergies    Physical Exam:    /79   Pulse 73   Resp 16   Ht 5' 6\" (1 676 m)   Wt 65 8 kg (145 lb)   BMI 23 40 kg/m²     Constitutional:normal, well developed, well nourished, alert, in no distress and non-toxic and no overt pain behavior    Eyes:anicteric  HEENT:grossly intact  Neck:supple, symmetric, trachea midline and no masses   Pulmonary:even and unlabored  Cardiovascular:No edema or pitting edema present  Skin:Normal without rashes or lesions and well hydrated  Psychiatric:Mood and affect appropriate  Neurologic:Cranial Nerves II-XII grossly intact  Musculoskeletal:normal     Cervical Spine Exam  Appearance:  Normal lordosis  Palpation/Tenderness:  right cervical paraspinal tenderness  right trapezium tenderness  Range of Motion:  Flexion:  Minimally limited  with pain  Extension:  Minimally limited  with pain  Motor Strength:  Left Arm Flexion  5/5  Left Arm Extension  5/5  Right Arm Flexion  5/5  Right Arm Extension  5/5  Left Wrist Flexion  5/5  Left Wrist Extension  5/5  Reflexes:  Left Biceps:  1+   Right Biceps:  1+   Left Triceps:  1+   Right Triceps:  1+     "

## 2023-04-06 ENCOUNTER — OFFICE VISIT (OUTPATIENT)
Dept: OCCUPATIONAL THERAPY | Facility: CLINIC | Age: 43
End: 2023-04-06

## 2023-04-06 DIAGNOSIS — G56.02 LEFT CARPAL TUNNEL SYNDROME: Primary | ICD-10-CM

## 2023-04-07 ENCOUNTER — OFFICE VISIT (OUTPATIENT)
Dept: OCCUPATIONAL THERAPY | Facility: CLINIC | Age: 43
End: 2023-04-07

## 2023-04-07 DIAGNOSIS — G56.02 LEFT CARPAL TUNNEL SYNDROME: Primary | ICD-10-CM

## 2023-04-07 NOTE — PROGRESS NOTES
"Daily Note     Today's date: 2023  Patient name: Brenda Cosme  : 1980  MRN: [id number]  Referring provider: Susan Tamayo DO  Dx:   Encounter Diagnosis     ICD-10-CM    1  Left carpal tunnel syndrome  G56 02                      Subjective: \"I've noticed a big difference since my injection in combination with the therapy\"  Objective: See treatment diary below       Assessment: Tolerated treatment well  Slight tightness and increased symptoms noted during supine median nerve glides in the wrist  Patient reported that symptoms have decreased since getting the injections and starting therapy  Patient experienced increased fatigue and discomfort on this date following extension web and TB on the wall  Plan: Continue per plan of care  Progress treatment as tolerated         Precautions: Universal    Manuals 3/23/23 3/29 3/31 4/6 4/7        IASTM  8' carpal tunnel 8' carpal tunnel 8' carpatunnel 8'        taping     carpal tunnel        Cupping   carpal tunnel                       Neuro Re-Ed             Median nerve glide     8' supine                                                                                      Ther Ex             HEP Wrist AROM    Tendon glide    opposition              Wrist PROM   8' 8' 8'        Wrist/Digit AROM  x10 each           Opposition marble/abd  x20 each x20 each          Wrist maze  x5 x5 x5 x5        TB on Wall  x5 x5 x5 x5        Wrist isometrics             Digiflex   Y iso x 10   Y iso x 10  R comp x20 Y iso x 10 R comp x20        Hand Power Pro    Y x 20 center and rim Y x 20 center and rim         Ext Web    Y x10 Y x10        Ther Activity             keypegs  x1 x1 half x1         [place] Sort     x1        Pinch Rings   Y/R x1 half way Y/R x1 Y/R x1        Velcro Pulls    x1 x1                     Modalities             [facility]  5' 5' 5' 5'                              "

## 2023-04-24 ENCOUNTER — OFFICE VISIT (OUTPATIENT)
Dept: OCCUPATIONAL THERAPY | Facility: CLINIC | Age: 43
End: 2023-04-24

## 2023-04-24 DIAGNOSIS — G56.02 LEFT CARPAL TUNNEL SYNDROME: Primary | ICD-10-CM

## 2023-04-24 NOTE — PROGRESS NOTES
"Daily Note     Today's date: 2023  Patient name: Floy Severance  : 1980  MRN: [id number]  Referring provider: Leeann Favre, MD  Dx:   Encounter Diagnosis     ICD-10-CM    1  Left carpal tunnel syndrome  G56 02                      Subjective: \"It's still tight\"  Objective: See treatment diary below       Assessment: Tolerated treatment well  Patient continued to report tightness in the 1st web space but stated it is decreasing  Therapist reincorporated resistance exercises on this date as patient stated they felt they were feeling good enough to try  Patient tolerated all exercises well but fatigued following small pegs  Plan: Continue per plan of care  Progress treatment as tolerated         Precautions: Universal    Manuals 3/23/23 3/29 3/31 4/6 4/7 4/12 4/13 4/17 4/24    IASTM  8' carpal tunnel 8' carpal tunnel 8' carpatunnel 8' 8' 8' 8' 8'    taping     carpal tunnel        Cupping   carpal tunnel                       Neuro Re-Ed             Median nerve glide     8' supine 8' 8' 8' 8'                                                                                  Ther Ex             HEP Wrist AROM    Tendon glide    opposition              1st Digit PROM        4' 3'    Wrist PROM   8' 8' 8' 5' 5' 3' 3'    Wrist/Digit AROM  x10 each           Opposition marble/abd  x20 each x20 each          Wrist maze  x5 x5 x5 x5 x5 x5 x5 x5    TB on Wall  x5 x5 x5 x5 x5 x5 x5 x5    Wrist isometrics             Digiflex   Y iso x 10   Y iso x 10  R comp x20 Y iso x 10 R comp x20 R iso x10   G comp x20 R iso x10  G comp x20 held R iso x10 G comp x20    Hand Power Pro    Y x 20 center and rim Y x 20 center and rim  R x 20 center and rim R x 20 center and rim held R x 20 center and rim    Ext Web    Y x10 Y x10 Y x 10 Y x 10 Y x 10 Y x 10    Ther Activity             keypegs  x1 x1 half x1   x1 x1 x1    [place] Sort     x1 x1  x1     Pinch Rings   Y/R x1 half way Y/R x1 Y/R x1 R/R x1 R/R x1 held R/R x1  " Velcro Pulls    x1 x1                     Modalities             [facility]  5' 5' 5' 5' 5' 10' 5' 5'

## 2023-05-01 ENCOUNTER — EVALUATION (OUTPATIENT)
Dept: PHYSICAL THERAPY | Facility: CLINIC | Age: 43
End: 2023-05-01

## 2023-05-01 ENCOUNTER — EVALUATION (OUTPATIENT)
Dept: OCCUPATIONAL THERAPY | Facility: CLINIC | Age: 43
End: 2023-05-01

## 2023-05-01 DIAGNOSIS — M54.2 NECK PAIN: Primary | ICD-10-CM

## 2023-05-01 DIAGNOSIS — G56.02 LEFT CARPAL TUNNEL SYNDROME: Primary | ICD-10-CM

## 2023-05-01 DIAGNOSIS — M79.18 MYOFASCIAL PAIN SYNDROME: ICD-10-CM

## 2023-05-01 NOTE — PROGRESS NOTES
OT Re-Evaluation        Today's date: 2023  Patient name: Gricelda Calderon  : 1980  MRN: [id number]  Referring provider: Shahriar Clemons DO  Dx:   Encounter Diagnosis     ICD-10-CM    1  Left carpal tunnel syndrome  G56 02                      Assessment  Assessment details: Patient initially presented with a diagnosis of left carpal tunnel syndrome  Patient symptoms began on 22 when their thumb was struck while working  Patient had previously seen hand therapy until 22  Patient followed up with orthopedics on 22 due to increase in symptoms and an EMG was scheduled  EMG displayed no acute findings  Patient was given a CSI injection into the carpal tunnel on 3/14/23 by orthopedics and reports that pain has decreased but still persists  Patient reported that in February they switched positions to being a  which has helped decrease symptoms  Patient initially presented with decreased wrist and 1st digit ROM, decreased /pinch strength, numbness and tingling in the L 1st-3rd digits, and increased pain during functional movement  Patient presents to re-evaluation after consistently attending OP OT services  Patient has made slight progress since the prior evaluation  Patient reported that their numbness and tingling has resolved, but they continue to have pain and tightness in the hand and wrist  Patient reported that pain in the L hand has decreased slightly but is still limiting their ability to complete tasks  Patient stated that they fatigue after holding objects for > 1 min  Patient /pinch strength has  modestly improved  ROM at the wrist has returned to [facility] but 1st digit ROM continues to be decreased as compared to the contralateral side  Patient would benefit from continued OT services to decrease pain and tightness, improve  strength, and to improve activity tolerance   See below for a more detailed assessment  Impairments: abnormal coordination, abnormal or restricted ROM, activity intolerance, impaired physical strength and pain with function    Symptom irritability: lowUnderstanding of Dx/Px/POC: good   Prognosis: good    Goals  STGs:    Patient will increase ROM in wrist by 10-20 degrees in all planes in 4 weeks- MET    Patient will increase ROM in 1st digit MP/IP by 10-20 degrees in 4 weeks- Progressing    Patient will increase  strength by 5-10 lbs in 4 weeks- Progressing    Patient will increase pinch strength by 3-5 lbs in 4 weeks - Progressing    Patient will report decreased pain during functional movement by 1-2 grades in 4 weeks- MET    Patient will report decreased numbness and tingling in digits 1-3 in 4 weeks- MET    Patient will demonstrate an understanding of HEP by end of initial session- MET    LTGs:    Patient will display wrist ROM WFL in 8 weeks or discharge- MET    Patient will display digit ROM WFL in 8 weeks or discharge    Patient will increase  strength by 10-20 lbs in 8 weeks or discharge    Patient will improve pinch strength by 5-10 lbs in 8 weeks or discharge    Patient will report the resolution of numbness and tingling in digits 1-3 in 8 weeks or discharge- MET    Patient will report resolution of pain symptoms during functional movement in 8 weeks or discharge    New STG:    Patient will report decreased feeling of tightness in the LUE in 4 weeks    Patient will report ability to hold objects for more than >1 min without pain and fatigue in 4 weeks    Plan  Plan details: Patient presenting to OP OT services due to a diagnosis of L carpal tunnel sydnrome  Patient would benefit from continued skilled occupational therapy services 2 times per week for 8 weeks to return to prior level of function and achieve all of their established goals   Thank you for the referral    Patient would benefit from: custom splinting, skilled occupational therapy and OT eval  Referral necessary: No  Planned modality interventions: ultrasound, "thermotherapy: hydrocollator packs and unattended electrical stimulation  Planned therapy interventions: IADL retraining, patient education, self care, manual therapy, orthotic fitting/training, strengthening, stretching, therapeutic activities, therapeutic exercise, home exercise program, functional ROM exercises and fine motor coordination training  Frequency: 2x week  Duration in visits: 10  Plan of Care beginning date: 2023  Plan of Care expiration date: 2023  Treatment plan discussed with: patient        Subjective Evaluation    History of Present Illness  Date of onset: 2023  Mechanism of injury: trauma  Mechanism of injury: Work Injury    Subjective: It feels like a rubber band in my hand still\"  \"I don't have the numbness and tingling anymore, so it's getting better\"  Recurrent probem    Quality of life: good    Pain  Current pain ratin  At best pain ratin  At worst pain ratin  Location: 1st digit and thenar eminaince  Quality: sharp, throbbing and tight  Relieving factors: support  Aggravating factors: lifting  Progression: improved    Social Support    Employment status: working ()  Hand dominance: right      Diagnostic Tests  X-ray: normal  EMG: normal  Treatments  Previous treatment: occupational therapy, medication and injection treatment  Current treatment: injection treatment and occupational therapy  Patient Goals  Patient goals for therapy: decreased pain, increased motion and increased strength          Objective     Tenderness     Right Wrist/Hand   No tenderness in the first dorsal compartment, second dorsal compartment and carpometacarpal joint       Neurological Testing     Sensation     Wrist/Hand   Left   Intact: light touch    Right   Intact: light touch    Active Range of Motion     Left Wrist   Wrist flexion: 75 degrees   Wrist extension: 75 degrees     Right Wrist   Wrist flexion: 75 degrees   Wrist extension: 75 degrees     Left Thumb   Flexion " MP: 58 degrees    DIP: 62 degrees  Palmar Abduction     CMC: 40 degrees  Radial abduction    CMC: 45 degrees  Opposition: Full opposition    Right Thumb   Flexion     MP: 64    DIP: 78  Palmar Abduction    CMC: 55  Radial Abduction    CMC: 55  Opposition: Full opposition     Additional Active Range of Motion Details  B/L full composite fist    Strength/Myotome Testing     Left Wrist/Hand   Normal wrist strength     (2nd hand position)     Trial 1: 15    Thumb Strength  Key/Lateral Pinch     Trial 1: 5  Tip/Two-Point Pinch     Trial 1: 5  Palmar/Three-Point Pinch     Trial 1: 3    Right Wrist/Hand   Normal wrist strength     (2nd hand position)     Trial 1: 49 6    Thumb Strength   Key/Lateral Pinch     Trial 1: 12  Tip/Two-Point Pinch     Trial 1: 8  Palmar/Three-Point Pinch     Trial 1: 10    Tests     Left Wrist/Hand   Negative CMC grind, Phalen's sign and Tinel's sign (medial nerve)  Right Wrist/Hand   Negative CMC grind, Phalen's sign and Tinel's sign (medial nerve)       Swelling     Left Wrist/Hand   Thumb     Proximal: 5 8 cm    Distal: 5 2 cm  Circumference MCP: 16 cm  Circumference wrist: 13 2 cm    Right Wrist/Hand   Thumb     Proximal: 5 8 cm    Distal: 5 2 cm  Circumference MCP: 16 cm  Circumference wrist: 13 2 cm  Neuro Exam:     Functional outcomes   Left 9 peg hole test: 37 2 (seconds)  Right 9 hole peg test: 24 76 (seconds)             Precautions: Universal    Manuals 3/23/23 3/29 3/31 4/6 4/7 4/12 4/13 4/17 4/24 5/1   IASTM  8' carpal tunnel 8' carpal tunnel 8' carpatunnel 8' 8' 8' 8' 8' 8'   taping     carpal tunnel        Cupping   carpal tunnel          Re-evaluation          completed   Neuro Re-Ed             Median nerve glide     8' supine 8' 8' 8' 8'                                                                                  Ther Ex             HEP Wrist AROM    Tendon glide    opposition              1st Digit PROM        4' 3'    Wrist PROM   8' 8' 8' 5' 5' 3' 3' Wrist/Digit AROM  x10 each           Opposition marble/abd  x20 each x20 each          Wrist maze  x5 x5 x5 x5 x5 x5 x5 x5 x5   TB on Wall  x5 x5 x5 x5 x5 x5 x5 x5 x5   Wrist isometrics             Digiflex   Y iso x 10   Y iso x 10  R comp x20 Y iso x 10 R comp x20 R iso x10   G comp x20 R iso x10  G comp x20 held R iso x10 G comp x20  R iso x10 G comp x20   Hand Power Pro    Y x 20 center and rim Y x 20 center and rim  R x 20 center and rim R x 20 center and rim held R x 20 center and rim G center and rim x20   Ext Web    Y x10 Y x10 Y x 10 Y x 10 Y x 10 Y x 10 Y x10   Ther Activity             keypegs  x1 x1 half x1   x1 x1 x1 x1   [place] Sort     x1 x1  x1     Pinch Rings   Y/R x1 half way Y/R x1 Y/R x1 R/R x1 R/R x1 held R/R x1 R/R x1   Velcro Pulls    x1 x1                     Modalities             [facility]  5' 5' 5' 5' 5' 10' 5' 5' 5'

## 2023-05-01 NOTE — PROGRESS NOTES
PT Evaluation     Today's date: 2023  Patient name: Karen Esparza  : 1980  MRN: [id number]  Referring provider: Rosy Barrett MD  Dx:   Encounter Diagnosis     ICD-10-CM    1  Neck pain  M54 2 Ambulatory referral to Physical Therapy      2  Myofascial pain syndrome  M79 18 Ambulatory referral to Physical Therapy                     Assessment  Assessment details: Patient is a 38 y o  female referred to PT by Dr Dawn Garcia with a dx of neck pain  Patient reports onset of pain complaints occurred insidiously in   Since that time she had continued to experience intermittent moderate to severe neck pain  The pain complaints are aggravated by cervical rotation and cervical extension  Clinical examination is consistent with cervical derangement with directional preference of extension  She displays no evidence of neurologically mediated weakness  No other referral appears necessary at this time  Impairments: abnormal or restricted ROM, lacks appropriate home exercise program, pain with function and poor posture   Functional limitations: IADLs; Work-related activitiesUnderstanding of Dx/Px/POC: good   Prognosis: good    Goals  Short Term goals - 4 weeks  1  Patient will be independent and compliant with a HEP  2   Patient will report a 50% decrease in pain complaints  Long Term goals - 8 weeks  1  Patient will report elimination of pain complaints  2   Patient will return to all work related activities without restriction  3   Patient will return to all recreational activities without restriction        Plan  Patient would benefit from: skilled physical therapy  Planned therapy interventions: joint mobilization, manual therapy, neuromuscular re-education, patient education, postural training, therapeutic exercise and home exercise program  Frequency: 1x week  Duration in visits: 6  Duration in weeks: 6  Plan of Care beginning date: 2023  Plan of Care expiration date: 2023  Treatment plan discussed with: patient        Subjective Evaluation    History of Present Illness  Date of onset: 2022  Mechanism of injury: Insidious onset of neck pain           Recurrent probem    Quality of life: good    Pain  Current pain ratin  At best pain ratin  At worst pain ratin  Location: Central-L c/spine  Quality: discomfort  Relieving factors: rest (neck support)  Aggravating factors: sitting (Cervical rotatoin; cervical extension)  Progression: improved    Social Support    Employment status: working  Treatments  Current treatment: physical therapy  Patient Goals  Patient goals for therapy: decreased pain, increased motion, increased strength, independence with ADLs/IADLs and return to sport/leisure activities          Objective     Active Range of Motion   Cervical/Thoracic Spine       Cervical    Subcranial retraction:   Restriction level: minimal  Flexion:  Restriction level: minimal  Extension:  Restriction level: moderate  Left lateral flexion:  WFL  Right lateral flexion:  WFL  Left rotation:  Restriction level: moderate  Right rotation:  Restriction level: minimal  Mechanical Assessment    Cervical    Seated retraction: repeated movements   Pain intensity: better  Pain level: decreased  Cervical rotation improved post performance  Seated Extension: repeated movements  Pain location: no change  Pain level: produced  Supine retractation: repeated movements  Pain location: no change    Thoracic      Lumbar      Strength/Myotome Testing   Cervical Spine     Left   Normal strength    Right   Normal strength             Precautions: None      Manuals             Man cerv retraction PRN                                                    Neuro Re-Ed                                                                                                        Ther Ex Ther Activity                                       Gait Training                                       Modalities

## 2023-05-03 ENCOUNTER — OFFICE VISIT (OUTPATIENT)
Dept: OCCUPATIONAL THERAPY | Facility: CLINIC | Age: 43
End: 2023-05-03

## 2023-05-03 DIAGNOSIS — G56.02 LEFT CARPAL TUNNEL SYNDROME: Primary | ICD-10-CM

## 2023-05-03 NOTE — PROGRESS NOTES
"Daily Note     Today's date: 5/3/2023  Patient name: Pinky Trevino  : 1980  MRN: [id number]  Referring provider: Funmilayo Greenberg DO  Dx:   Encounter Diagnosis     ICD-10-CM    1  Left carpal tunnel syndrome  G56 02                      Subjective: \"It's ok today, nothing to complain about\"  Objective: See treatment diary below      Assessment: Tolerated treatment well  Patient fatigued after ball on the wall exercises  Continues to have pain during functional activities  ROM is WNL  Plan: Continue per plan of care  Progress treatment as tolerated         Precautions: Universal    Manuals 5/3 3/29 3/31 4/6 4/7 4/12 4/13 4/17 4/24 5/1   IASTM 8' 8' carpal tunnel 8' carpal tunnel 8' carpatunnel 8' 8' 8' 8' 8' 8'   taping     carpal tunnel        Cupping   carpal tunnel          Re-evaluation          completed   Neuro Re-Ed             Median nerve glide 8'    8' supine 8' 8' 8' 8'                                                                                  Ther Ex             HEP             1st Digit PROM 3'       4' 3'    Wrist PROM 3'  8' 8' 8' 5' 5' 3' 3'    Wrist/Digit AROM  x10 each           Opposition marble/abd  x20 each x20 each          Wrist maze x5 x5 x5 x5 x5 x5 x5 x5 x5 x5   TB on Wall x5 x5 x5 x5 x5 x5 x5 x5 x5 x5   Wrist isometrics             Digiflex R iso x10 G comp x20  Y iso x 10   Y iso x 10  R comp x20 Y iso x 10 R comp x20 R iso x10   G comp x20 R iso x10  G comp x20 held R iso x10 G comp x20  R iso x10 G comp x20   Hand Power Pro G center and rim x20   Y x 20 center and rim Y x 20 center and rim  R x 20 center and rim R x 20 center and rim held R x 20 center and rim G center and rim x20   Ext Web Y x10   Y x10 Y x10 Y x 10 Y x 10 Y x 10 Y x 10 Y x10   Ther Activity             keypegs x1 x1 x1 half x1   x1 x1 x1 x1   [place] Sort     x1 x1  x1     Pinch Rings R/R x1  Y/R x1 half way Y/R x1 Y/R x1 R/R x1 R/R x1 held R/R x1 R/R x1   Velcro Pulls    x1 x1                   " Modalities             [facility] 5' 5' 5' 5' 5' 5' 10' 5' 5' 5'

## 2023-05-08 ENCOUNTER — OFFICE VISIT (OUTPATIENT)
Dept: OCCUPATIONAL THERAPY | Facility: CLINIC | Age: 43
End: 2023-05-08

## 2023-05-08 ENCOUNTER — APPOINTMENT (OUTPATIENT)
Dept: PHYSICAL THERAPY | Facility: CLINIC | Age: 43
End: 2023-05-08
Payer: OTHER MISCELLANEOUS

## 2023-05-08 DIAGNOSIS — G56.02 LEFT CARPAL TUNNEL SYNDROME: Primary | ICD-10-CM

## 2023-05-08 NOTE — PROGRESS NOTES
"Daily Note     Today's date: 2023  Patient name: Brooke Espinal  : 1980  MRN: [id number]  Referring provider: Shaye Jimenez DO  Dx:   Encounter Diagnosis     ICD-10-CM    1  Left carpal tunnel syndrome  G56 02                      Subjective: \"My hand just feels tight all the time\"  Objective: See treatment diary below  0271-0451 MHP  6751-4166 8' Manual  4323-2303 unsup  2926-2349 8' TE  4001-5098 8' TA  5662-1651 unsup    Assessment: Tolerated treatment well  Patient continued to fatigue during all exercises, requiring rest breaks  Patient still reports tightness in the 1st web space  Plan: Continue per plan of care  Progress treatment as tolerated         Precautions: Universal    Manuals 5/3 5/8 3/31 4/6 4/7 4/12 4/13 4/17 4/24 5/1   IASTM 8' 8' carpal tunnel 8' carpal tunnel 8' carpatunnel 8' 8' 8' 8' 8' 8'   taping  Carpal tunel   carpal tunnel        Cupping   carpal tunnel          Re-evaluation          completed   Neuro Re-Ed             Median nerve glide 8'    8' supine 8' 8' 8' 8'                                                                                  Ther Ex             HEP             1st Digit PROM 3' 3'      4' 3'    Wrist PROM 3' 3' 8' 8' 8' 5' 5' 3' 3'    Wrist/Digit AROM             Opposition marble/abd   x20 each          Wrist maze x5 x5 x5 x5 x5 x5 x5 x5 x5 x5   TB on Wall x5 x5 x5 x5 x5 x5 x5 x5 x5 x5   Wrist isometrics             Digiflex R iso x10 G comp x20 R iso x10 G comp x20 Y iso x 10   Y iso x 10  R comp x20 Y iso x 10 R comp x20 R iso x10   G comp x20 R iso x10  G comp x20 held R iso x10 G comp x20  R iso x10 G comp x20   Hand Power Pro G center and rim x20 G center and rim x20  Y x 20 center and rim Y x 20 center and rim  R x 20 center and rim R x 20 center and rim held R x 20 center and rim G center and rim x20   Ext Web Y x10 Y x10  Y x10 Y x10 Y x 10 Y x 10 Y x 10 Y x 10 Y x10   Ther Activity             keypegs x1 x1 x1 half x1   x1 x1 x1 x1 " [place] Sort     x1 x1  x1     Pinch Rings R/R x1 R/R x1 Y/R x1 half way Y/R x1 Y/R x1 R/R x1 R/R x1 held R/R x1 R/R x1   Velcro Pulls    x1 x1                     Modalities             M[facility] 5' 5' 5' 5' 5' 5' 10' 5' 5' 5'

## 2023-05-09 NOTE — PROCEDURE NOTE - NSPERIPVASCEVA_GEN_A_CORE
fingers/toes warm to touch/capillary refill time < 2 seconds Photo Preface (Leave Blank If You Do Not Want): Photographs were obtained today Detail Level: Simple

## 2023-05-10 ENCOUNTER — OFFICE VISIT (OUTPATIENT)
Dept: OCCUPATIONAL THERAPY | Facility: CLINIC | Age: 43
End: 2023-05-10

## 2023-05-10 DIAGNOSIS — G56.02 LEFT CARPAL TUNNEL SYNDROME: Primary | ICD-10-CM

## 2023-05-10 NOTE — PROGRESS NOTES
"Daily Note     Today's date: 5/10/2023  Patient name: Emilee Ko  : 1980  MRN: [id number]  Referring provider: Jaky Pagan DO  Dx:   Encounter Diagnosis     ICD-10-CM    1  Left carpal tunnel syndrome  G56 02                      Subjective: \"The tightness is still here\"  Objective: See treatment diary below      Assessment: Tolerated treatment well  Patient continued to have tightness in the 1st web space  Patient displayed slight discomfort and fatigue during resistance exercises  Patient continued to require rest breaks to rest the hand between resistive gripping exercises  Plan: Continue per plan of care  Progress treatment as tolerated         Precautions: Universal    Manuals 5/3 5/8 5/10 4/6 4/7 4/12 4/13 4/17 4/24 5/1   IASTM 8' 8' carpal tunnel 8' carpal tunnel 8' carpatunnel 8' 8' 8' 8' 8' 8'   taping  Carpal tunel Carpal Tunnel  carpal tunnel        Cupping             Re-evaluation          completed   Neuro Re-Ed             Median nerve glide 8'    8' supine 8' 8' 8' 8'                                                                                  Ther Ex             HEP             1st Digit PROM 3' 3' 3'     4' 3'    Wrist PROM 3' 3' 8' 8' 8' 5' 5' 3' 3'    Wrist/Digit AROM             Opposition marble/abd             Wrist maze x5 x5 x5 x5 x5 x5 x5 x5 x5 x5   TB on Wall x5 x5 x5 x5 x5 x5 x5 x5 x5 x5   Wrist isometrics             Digiflex R iso x10 G comp x20 R iso x10 G comp x20 R iso x10    G comp x20   Y iso x 10  R comp x20 Y iso x 10 R comp x20 R iso x10   G comp x20 R iso x10  G comp x20 held R iso x10 G comp x20  R iso x10 G comp x20   Hand Power Pro G center and rim x20 G center and rim x20 G center and rim x20 Y x 20 center and rim Y x 20 center and rim  R x 20 center and rim R x 20 center and rim held R x 20 center and rim G center and rim x20   Thumb [place]   1 RB x 20          Power Web Adduction   Yellow x 20          Ext Web Y x10 Y x10 Y x10 Y x10 Y x10 Y " x 10 Y x 10 Y x 10 Y x 10 Y x10   Ther Activity             keypegs x1 x1 x1  x1   x1 x1 x1 x1   [place] Sort     x1 x1  x1     Pinch Rings R/R x1 R/R x1 R/R x1 half way Y/R x1 Y/R x1 R/R x1 R/R x1 held R/R x1 R/R x1   Velcro Pulls    x1 x1                     Modalities             MHP 5' 5' w/ paraffin 5' 5' 5' 5' 10' 5' 5' 5'

## 2023-05-15 ENCOUNTER — OFFICE VISIT (OUTPATIENT)
Dept: OCCUPATIONAL THERAPY | Facility: CLINIC | Age: 43
End: 2023-05-15

## 2023-05-15 ENCOUNTER — APPOINTMENT (OUTPATIENT)
Dept: PHYSICAL THERAPY | Facility: CLINIC | Age: 43
End: 2023-05-15
Payer: OTHER MISCELLANEOUS

## 2023-05-15 DIAGNOSIS — G56.02 LEFT CARPAL TUNNEL SYNDROME: Primary | ICD-10-CM

## 2023-05-15 NOTE — PROGRESS NOTES
"Daily Note     Today's date: 5/15/2023  Patient name: William Wagner  : 1980  MRN: [id number]  Referring provider: Gabriella Yepez DO  Dx:   Encounter Diagnosis     ICD-10-CM    1  Left carpal tunnel syndrome  G56 02                      Subjective: \"The tightness is better than it has been\"  Objective: See treatment diary below      Assessment: Tolerated treatment well  Patient reported decreased tightness in the 1st web space on this date  Patient had increased symptoms following wrist PROM and was unable to finish all of their exercises listed below  Therapist will make adjustments at subsequent visit if symptoms continue to be elevated  Plan: Continue per plan of care  Progress treatment as tolerated         Precautions: Universal    Manuals 5/3 5/8 5/10 5/15 4/7 4/12 4/13 4/17 4/24 5/1   IASTM 8' 8' carpal tunnel 8' carpal tunnel 8' carpatunnel 8' 8' 8' 8' 8' 8'   taping  Carpal tunel Carpal Tunnel  carpal tunnel        Cupping             Re-evaluation          completed   Neuro Re-Ed             Median nerve glide 8'    8' supine 8' 8' 8' 8'                                                                                  Ther Ex             HEP             1st Digit PROM 3' 3' 3' 4'    4' 3'    Wrist PROM 3' 3' 8' 6' 8' 5' 5' 3' 3'    Wrist/Digit AROM             Opposition marble/abd             Wrist maze x5 x5 x5 x5 x5 x5 x5 x5 x5 x5   TB on Wall x5 x5 x5 x5 x5 x5 x5 x5 x5 x5   Wrist isometrics             Digiflex R iso x10 G comp x20 R iso x10 G comp x20 R iso x10    G comp x20   R iso x 10  G comp x20 Y iso x 10 R comp x20 R iso x10   G comp x20 R iso x10  G comp x20 held R iso x10 G comp x20  R iso x10 G comp x20   Hand Power Pro G center and rim x20 G center and rim x20 G center and rim x20  Y x 20 center and rim  R x 20 center and rim R x 20 center and rim held R x 20 center and rim G center and rim x20   Thumb [place]   1 RB x 20          Power Web Adduction   Yellow x 20          Ext " Web Y x10 Y x10 Y x10 Y x10 Y x10 Y x 10 Y x 10 Y x 10 Y x 10 Y x10   Ther Activity             keypegs x1 x1 x1     x1 x1 x1 x1   [place] Sort     x1 x1  x1     Pinch Rings R/R x1 R/R x1 R/R x1 half way  Y/R x1 R/R x1 R/R x1 held R/R x1 R/R x1   Velcro Pulls     x1                     Modalities             MHP 5' 5' w/ paraffin 5' 5' 5' 5' 10' 5' 5' 5'

## 2023-05-16 ENCOUNTER — OFFICE VISIT (OUTPATIENT)
Dept: OBGYN CLINIC | Facility: CLINIC | Age: 43
End: 2023-05-16

## 2023-05-16 VITALS
HEIGHT: 66 IN | WEIGHT: 146 LBS | DIASTOLIC BLOOD PRESSURE: 76 MMHG | HEART RATE: 60 BPM | SYSTOLIC BLOOD PRESSURE: 110 MMHG | BODY MASS INDEX: 23.46 KG/M2

## 2023-05-16 DIAGNOSIS — G56.02 CARPAL TUNNEL SYNDROME ON LEFT: Primary | ICD-10-CM

## 2023-05-16 NOTE — LETTER
May 16, 2023     Patient: Misbah Dyer  YOB: 1980  Date of Visit: 5/16/2023      To Whom it May Concern:    Misbah Dyer is under my professional care  Kristal Do was seen in my office on 5/16/2023  Kristal Do may continue to work full duty with no restrictions  If you have any questions or concerns, please don't hesitate to call           Sincerely,          Marcy Pitts MD

## 2023-05-16 NOTE — PROGRESS NOTES
ORTHOPAEDIC HAND, WRIST, AND ELBOW OFFICE  VISIT       ASSESSMENT/PLAN:      Diagnoses and all orders for this visit:    Carpal tunnel syndrome on left  -     Durable Medical Equipment  -     Ambulatory Referral to PT/OT Hand Therapy; Future        The patient is doing well  She did see good relief from previous carpal tunnel injection  The patient was advised to continue with OT to work on 1st webspace stretching and massage  She was fitted and provided with a thumb spica splint she was advised to wear at night  A prescription was provided for Voltaren Gel  She may continue to work full duty with no restrictions and a note was provided for this today  The patient verbalized understanding of exam findings and treatment plan  We engaged in the shared decision-making process and treatment options were discussed at length with the patient  Surgical and conservative management discussed today along with risks and benefits  Follow Up:  2 months       To Do Next Visit:  Re-evaluation of current issue        Mark Zacarias MD  Attending, Orthopaedic Surgery  Hand, Wrist, and Elbow Surgery  [facility]    ____________________________________________________________________________________________________________________________________________      CHIEF COMPLAINT:  Chief Complaint   Patient presents with   • Left Hand - Follow-up       SUBJECTIVE:  Patient is a 41 y o  RHD female who presents today for follow up of left hand numbness and tingling possible carpal tunnel syndrome  The patient underwent a left carpal tunnel steroid injection at her last visit which she states provided her with good relief  She notes 100% improvement in the numbness and tingling  She notes pain and tightness in the 1st webspace  She has been attending OT without relief  She has not been taking anything OTC for pain  She denies any locking or catching             I have personally reviewed all the relevant [facility], PSH, SH, FH, Medications and allergies      PAST MEDICAL HISTORY:  Past Medical History:   Diagnosis Date   • Fibroid        PAST SURGICAL HISTORY:  Past Surgical History:   Procedure Laterality Date   •  SECTION     • HAND SURGERY         FAMILY HISTORY:  Family History   Problem Relation Age of Onset   • Fibroids Sister    • No Known Problems Mother    • No Known Problems Father    • No Known Problems Daughter    • Breast cancer Maternal Grandmother 61   • No Known Problems Maternal Grandfather    • No Known Problems Paternal Grandmother    • No Known Problems Paternal Grandfather    • No Known Problems Sister    • No Known Problems Son    • No Known Problems Maternal Aunt    • No Known Problems Maternal Aunt    • No Known Problems Paternal Aunt        SOCIAL HISTORY:  Social History     Tobacco Use   • Smoking status: Never   • Smokeless tobacco: Never   Vaping Use   • Vaping Use: Never used   Substance Use Topics   • Alcohol use: Yes     Comment: socailly    • Drug use: Never       MEDICATIONS:    Current Outpatient Medications:   •  methocarbamol (ROBAXIN) 500 mg tablet, Take 1 tablet (500 mg total) by mouth 2 (two) times a day as needed for muscle spasms, Disp: 60 tablet, Rfl: 1  •  naproxen (NAPROSYN) 500 mg tablet, Take 500 mg by mouth 2 (two) times a day as needed (Patient not taking: Reported on 2022), Disp: , Rfl:     ALLERGIES:  No Known Allergies        REVIEW OF SYSTEMS:  Musculoskeletal:        As noted in HPI  All other systems reviewed and are negative      VITALS:  Vitals:    23 0820   BP: 110/76   Pulse: 60       LABS:  HgA1c: No results found for: HGBA1C  BMP: No results found for: GLUCOSE, CALCIUM, NA, K, CO2, CL, BUN, CREATININE    _____________________________________________________  PHYSICAL EXAMINATION:  General: well developed and well nourished, alert, oriented times 3 and appears comfortable  Psychiatric: Normal  HEENT: Normocephalic, Atraumatic Trachea Midline, No torticollis  Pulmonary: No audible wheezing or respiratory distress   Abdomen/GI: Non tender, non distended   Cardiovascular: No pitting edema, 2+ radial pulse   Skin: No masses, erythema, lacerations, fluctation, ulcerations  Neurovascular: Sensation Intact to the Median, Ulnar, Radial Nerve, Motor Intact to the Median, Ulnar, Radial Nerve and Pulses Intact  Musculoskeletal: Normal, except as noted in detailed exam and in HPI        MUSCULOSKELETAL EXAMINATION:  Left hand   - tinel's full fist  Compartments soft  Brisk capillary refill   ___________________________________________________  STUDIES REVIEWED:  No studies to review         PROCEDURES PERFORMED:  Procedures  No Procedures performed today    _____________________________________________________      Scribe Attestation    I,:  Beena Jackson MA am acting as a scribe while in the presence of the attending physician :       I,:  Karan Rai MD personally performed the services described in this documentation    as scribed in my presence :

## 2023-05-22 ENCOUNTER — APPOINTMENT (OUTPATIENT)
Dept: PHYSICAL THERAPY | Facility: CLINIC | Age: 43
End: 2023-05-22
Payer: OTHER MISCELLANEOUS

## 2023-06-01 ENCOUNTER — OFFICE VISIT (OUTPATIENT)
Dept: OCCUPATIONAL THERAPY | Facility: CLINIC | Age: 43
End: 2023-06-01

## 2023-06-01 DIAGNOSIS — G56.02 LEFT CARPAL TUNNEL SYNDROME: Primary | ICD-10-CM

## 2023-06-01 NOTE — PROGRESS NOTES
"Daily Note     Today's date: 2023  Patient name: Angelia Holstein  : 1980  MRN: [id number]  Referring provider: Tyrell Narayan MD  Dx:   Encounter Diagnosis     ICD-10-CM    1  Left carpal tunnel syndrome  G56 02                      Subjective: \"I feel like I can't stretch it out\", referring to thumb abduction     Objective: See treatment diary below      Assessment: Experiences neural spasm with stretch, but full thumb motion achieved  Plan: Continue per plan of care  Progress treatment as tolerated         Precautions: Universal    Manuals 5/3 5/8 5/10 5/15 6/1 4/12 4/13 4/17 4/24 5/1   IASTM 8' 8' carpal tunnel 8' carpal tunnel 8' carpatunnel 8' thenar  8' 8' 8' 8' 8'   taping  Carpal tunel Carpal Tunnel          Cupping             Re-evaluation          completed   Neuro Re-Ed             Median nerve glide 8'     8' 8' 8' 8'                                                                                  Ther Ex             HEP             1st Digit PROM 3' 3' 3' 4' 5'   4' 3'    Wrist PROM 3' 3' 8' 6'  5' 5' 3' 3'    Wrist/Digit AROM             Opposition marble/abd             Wrist maze x5 x5 x5 x5  x5 x5 x5 x5 x5   TB on Wall x5 x5 x5 x5 x5 green ball x5 x5 x5 x5 x5   Wrist isometrics             Digiflex R iso x10 G comp x20 R iso x10 G comp x20 R iso x10    G comp x20   R iso x 10  G comp x20  R iso x10   G comp x20 R iso x10  G comp x20 held R iso x10 G comp x20  R iso x10 G comp x20   Hand Power Pro G center and rim x20 G center and rim x20 G center and rim x20  Green center and rim x20 R x 20 center and rim R x 20 center and rim held R x 20 center and rim G center and rim x20   Thumb [place]   1 RB x 20  20x        Power Web Adduction   Yellow x 20          Ext Web Y x10 Y x10 Y x10 Y x10 orange 20x Y x 10 Y x 10 Y x 10 Y x 10 Y x10   Ther Activity             keypegs x1 x1 x1   x1 x1 x1 x1 x1 x1   [place] Sort      x1  x1     Pinch Rings R/R x1 R/R x1 R/R x1 half way  R/R x2 R/R x1 R/R " x1 held R/R x1 R/R x1   Velcro Pulls     1x        Jar turning      yellow 10x        Modalities             MHP 5' 5' w/ paraffin 5' 5' 5' 5' 10' 5' 5' 5'

## 2023-06-14 ENCOUNTER — OFFICE VISIT (OUTPATIENT)
Dept: OCCUPATIONAL THERAPY | Facility: CLINIC | Age: 43
End: 2023-06-14
Payer: OTHER MISCELLANEOUS

## 2023-06-14 DIAGNOSIS — G56.02 LEFT CARPAL TUNNEL SYNDROME: Primary | ICD-10-CM

## 2023-06-14 PROCEDURE — 97110 THERAPEUTIC EXERCISES: CPT

## 2023-06-14 PROCEDURE — 97530 THERAPEUTIC ACTIVITIES: CPT

## 2023-06-14 NOTE — PROGRESS NOTES
OT Re-Evaluation        Today's date: 2023  Patient name: Marj Kramer  : 1980  MRN: [id number]  Referring provider: Kam Del Real MD  Dx:   Encounter Diagnosis     ICD-10-CM    1  Left carpal tunnel syndrome  G56 02                      Assessment  Assessment details: Patient initially presented with a diagnosis of left carpal tunnel syndrome  Patient symptoms began on 22 when their thumb was struck while working  Patient had previously seen hand therapy until 22  Patient followed up with orthopedics on 22 due to increase in symptoms and an EMG was scheduled  EMG displayed no acute findings  Patient was given a CSI injection into the carpal tunnel on 3/14/23 by orthopedics and reports that pain has decreased but still persists  Patient reported that in February they switched positions to being a  which has helped decrease symptoms  Patient initially presented with decreased wrist and 1st digit ROM, decreased /pinch strength, numbness and tingling in the L 1st-3rd digits, and increased pain during functional movement  Patient presents to re-evaluation after consistently attending OP OT services  Patient has made progress since the prior re-evaluation  Patient continues to report the resolution of numbness and tingling  Patient reported that pain in the L hand has remained the same and it continues to limit their functional ability  Patient  strength improved by 5 lbs on this date which was the established goal for  strength  While patient strength has improved they are still significantly limited as compared to their contralateral side  ROM has improved in the wrist  Patients major compliant at this time is the tightness and pain which they feel is the main cause of their limitations  Patient would benefit from continued OT services to decrease pain and tightness, improve  strength, and to improve activity tolerance   See below for a more detailed assessment  Impairments: abnormal coordination, abnormal or restricted ROM, activity intolerance, impaired physical strength and pain with function    Symptom irritability: lowUnderstanding of Dx/Px/POC: good   Prognosis: good    Goals  STGs:    Patient will increase ROM in wrist by 10-20 degrees in all planes in 4 weeks- MET    Patient will increase ROM in 1st digit MP/IP by 10-20 degrees in 4 weeks- MET    Patient will increase  strength by 5-10 lbs in 4 weeks- MET    Patient will increase pinch strength by 3-5 lbs in 4 weeks - Progressing    Patient will report decreased pain during functional movement by 1-2 grades in 4 weeks- MET    Patient will report decreased numbness and tingling in digits 1-3 in 4 weeks- MET    Patient will demonstrate an understanding of HEP by end of initial session- MET    LTGs:    Patient will display wrist ROM WFL in 8 weeks or discharge- MET    Patient will display digit ROM WFL in 8 weeks or discharge    Patient will increase  strength by 10-20 lbs in 8 weeks or discharge    Patient will improve pinch strength by 5-10 lbs in 8 weeks or discharge    Patient will report the resolution of numbness and tingling in digits 1-3 in 8 weeks or discharge- MET    Patient will report resolution of pain symptoms during functional movement in 8 weeks or discharge    New STG:    Patient will report decreased feeling of tightness in the LUE in 4 weeks- Progressing    Patient will report ability to hold objects for more than >1 min without pain and fatigue in 4 weeks- Progressing    Patient will report decreased pain while at rest by 1-2 grades    Plan  Plan details: Patient presenting to OP OT services due to a diagnosis of L carpal tunnel sydnrome  Patient would benefit from continued skilled occupational therapy services 1x per week for 8 weeks to return to prior level of function and achieve all of their established goals   Thank you for the referral    Patient would benefit from: custom "splinting, skilled occupational therapy and OT eval  Referral necessary: No  Planned modality interventions: ultrasound, thermotherapy: hydrocollator packs and unattended electrical stimulation  Planned therapy interventions: IADL retraining, patient education, self care, manual therapy, orthotic fitting/training, strengthening, stretching, therapeutic activities, therapeutic exercise, home exercise program, functional ROM exercises and fine motor coordination training  Frequency: 1x week  Duration in visits: 10  Plan of Care beginning date: 2023  Plan of Care expiration date: 2023  Treatment plan discussed with: patient        Subjective Evaluation    History of Present Illness  Date of onset: 2023  Mechanism of injury: trauma  Mechanism of injury: Work Injury    Subjective:  I still have tightness and I can't move it all the way\"  \"The pain has stayed the same\"  \"I still can't carry grocery bags for an extended period of time\"  Recurrent probem    Quality of life: good    Pain  Current pain ratin  At best pain ratin  At worst pain ratin  Location: 1st digit and thenar eminaince  Quality: sharp, throbbing and tight  Relieving factors: support  Aggravating factors: lifting  Progression: improved    Social Support    Employment status: working ()  Hand dominance: right      Diagnostic Tests  X-ray: normal  EMG: normal  Treatments  Previous treatment: occupational therapy, medication and injection treatment  Current treatment: occupational therapy  Patient Goals  Patient goals for therapy: decreased pain, increased motion and increased strength          Objective     Tenderness     Right Wrist/Hand   No tenderness in the first dorsal compartment, second dorsal compartment and carpometacarpal joint       Neurological Testing     Sensation     Wrist/Hand   Left   Intact: light touch    Right   Intact: light touch    Active Range of Motion     Left Wrist   Wrist flexion: 75 " degrees   Wrist extension: 75 degrees     Right Wrist   Wrist flexion: 75 degrees   Wrist extension: 75 degrees     Left Thumb   Flexion     MP: 60 degrees    DIP: 68 degrees  Palmar Abduction     CMC: 55 degrees  Radial abduction    CMC: 50 degrees  Opposition: Full opposition    Right Thumb   Flexion     MP: 64    DIP: 78  Palmar Abduction    CMC: 55  Radial Abduction    CMC: 55  Opposition: Full opposition     Additional Active Range of Motion Details  B/L full composite fist    Strength/Myotome Testing     Left Wrist/Hand   Normal wrist strength     (2nd hand position)     Trial 1: 20 2    Thumb Strength  Key/Lateral Pinch     Trial 1: 6  Tip/Two-Point Pinch     Trial 1: 6  Palmar/Three-Point Pinch     Trial 1: 6    Right Wrist/Hand   Normal wrist strength     (2nd hand position)     Trial 1: 49 6    Thumb Strength   Key/Lateral Pinch     Trial 1: 12  Tip/Two-Point Pinch     Trial 1: 8  Palmar/Three-Point Pinch     Trial 1: 10    Tests     Left Wrist/Hand   Negative CMC grind, Phalen's sign and Tinel's sign (medial nerve)  Right Wrist/Hand   Negative CMC grind, Phalen's sign and Tinel's sign (medial nerve)       Swelling     Left Wrist/Hand   Thumb     Proximal: 5 8 cm    Distal: 5 2 cm  Circumference MCP: 16 cm  Circumference wrist: 13 2 cm    Right Wrist/Hand   Thumb     Proximal: 5 8 cm    Distal: 5 2 cm  Circumference MCP: 16 cm  Circumference wrist: 13 2 cm  Neuro Exam:     Functional outcomes   Left 9 peg hole test: 37 2 (seconds)  Right 9 hole peg test: 24 76 (seconds)             Precautions: Universal    Manuals 5/3 5/8 5/10 5/15 6/1 6/14 4/13 4/17 4/24 5/1   IASTM 8' 8' carpal tunnel 8' carpal tunnel 8' carpatunnel 8' thenar   8' 8' 8' 8'   taping  Carpal tunel Carpal Tunnel          Cupping             Re-evaluation      completed    completed   Neuro Re-Ed             Median nerve glide 8'      8' 8' 8'                                                                                  Ther Ex             HEP             1st Digit PROM 3' 3' 3' 4' 5' 5'  4' 3'    Wrist PROM 3' 3' 8' 6'   5' 3' 3'    Wrist/Digit AROM             Opposition marble/abd             Wrist maze x5 x5 x5 x5   x5 x5 x5 x5   TB on Wall x5 x5 x5 x5 x5 green ball x5 green x5 x5 x5 x5   Wrist isometrics             Digiflex R iso x10 G comp x20 R iso x10 G comp x20 R iso x10    G comp x20   R iso x 10  G comp x20   R iso x10  G comp x20 held R iso x10 G comp x20  R iso x10 G comp x20   Hand Power Pro G center and rim x20 G center and rim x20 G center and rim x20  Green center and rim x20 Red center and rim x20 R x 20 center and rim held R x 20 center and rim G center and rim x20   Thumb [place]   1 RB x 20  20x 20x       Power Web Adduction   Yellow x 20          Ext Web Y x10 Y x10 Y x10 Y x10 orange 20x orange 20x Y x 10 Y x 10 Y x 10 Y x10   Ther Activity             keypegs x1 x1 x1   x1 x1 x1 x1 x1 x1   [place] Sort        x1     Pinch Rings R/R x1 R/R x1 R/R x1 half way  R/R x2 R/G x1 R/R x1 held R/R x1 R/R x1   Velcro Pulls     1x x1       Jar turning      yellow 10x orange x10       Modalities             MHP 5' 5' w/ paraffin 5' 5' 5' 5' 10' 5' 5' 5'

## 2023-06-21 ENCOUNTER — OFFICE VISIT (OUTPATIENT)
Dept: OCCUPATIONAL THERAPY | Facility: CLINIC | Age: 43
End: 2023-06-21
Payer: OTHER MISCELLANEOUS

## 2023-06-21 DIAGNOSIS — G56.02 LEFT CARPAL TUNNEL SYNDROME: Primary | ICD-10-CM

## 2023-06-21 PROCEDURE — 97140 MANUAL THERAPY 1/> REGIONS: CPT

## 2023-06-21 PROCEDURE — 97110 THERAPEUTIC EXERCISES: CPT

## 2023-06-21 PROCEDURE — 97530 THERAPEUTIC ACTIVITIES: CPT

## 2023-06-21 NOTE — PROGRESS NOTES
"Daily Note     Today's date: 2023  Patient name: Ajay Gil  : 1980  MRN: [id number]  Referring provider: Delphine Herrera MD  Dx:   Encounter Diagnosis     ICD-10-CM    1  Left carpal tunnel syndrome  G56 02                      Subjective: \"The last putty exercise made it more sore and tired\"  Objective: See treatment diary below  0737-2975 MHP  4177-4644 8' Manual  9325-5198 unsup  6179-5612 8' TE  9991-0272 unsup  9207-5605 8' TA    Assessment: Tolerated treatment well  Patient continues to have increased tightness noted during all activities  ROM WNL  Patient tolerated all exercises well but had increased discomfort during putty exercises  Will continue to progress treatment as able  Plan: Continue per plan of care  Progress treatment as tolerated         Precautions: Universal    Manuals 5/3 5/8 5/10 5/15 6/1 6/14 6/21 4/17 4/24 5/1   IASTM 8' 8' carpal tunnel 8' carpal tunnel 8' carpatunnel 8' thenar   8' 8' 8' 8'   taping  Carpal tunel Carpal Tunnel          Cupping             Re-evaluation      completed    completed   Neuro Re-Ed             Median nerve glide 8'       8' 8'                                                                                  Ther Ex             HEP             1st Digit PROM 3' 3' 3' 4' 5' 5' 5' 4' 3'    Wrist PROM 3' 3' 8' 6'   2' 3' 3'    Wrist/Digit AROM             Opposition marble/abd             Wrist maze x5 x5 x5 x5    x5 x5 x5   TB on Wall x5 x5 x5 x5 x5 green ball x5 green x5 green x5 x5 x5   Wrist isometrics             Digiflex R iso x10 G comp x20 R iso x10 G comp x20 R iso x10    G comp x20   R iso x 10  G comp x20    held R iso x10 G comp x20  R iso x10 G comp x20   Hand Power Pro G center and rim x20 G center and rim x20 G center and rim x20  Green center and rim x20 Red center and rim x20 Red center and rim x20 held R x 20 center and rim G center and rim x20   Thumb [place]   1 RB x 20  20x 20x 20x      Power Web Adduction   Yellow x " 20          Ext Web Y x10 Y x10 Y x10 Y x10 orange 20x orange 20x orange x20 Y x 10 Y x 10 Y x10   Ther Activity             keypegs x1 x1 x1   x1 x1 x1 x1 x1 x1   [place] Sort        x1     Pinch Rings R/R x1 R/R x1 R/R x1 half way  R/R x2 R/G x1 G/G x1 held R/R x1 R/R x1   Velcro Pulls     1x x1       Jar turning      yellow 10x orange x10 Orange x10 and #2      Modalities             MHP 5' 5' w/ paraffin 5' 5' 5' 5' 5' 5' 5' 5'

## 2023-06-29 ENCOUNTER — OFFICE VISIT (OUTPATIENT)
Dept: OCCUPATIONAL THERAPY | Facility: CLINIC | Age: 43
End: 2023-06-29
Payer: OTHER MISCELLANEOUS

## 2023-06-29 DIAGNOSIS — G56.02 LEFT CARPAL TUNNEL SYNDROME: Primary | ICD-10-CM

## 2023-06-29 PROCEDURE — 97110 THERAPEUTIC EXERCISES: CPT

## 2023-06-29 NOTE — PROGRESS NOTES
"Daily Note     Today's date: 2023  Patient name: Niya Borges  : 1980  MRN: [id number]  Referring provider: Loraine Smith MD  Dx:   Encounter Diagnosis     ICD-10-CM    1  Left carpal tunnel syndrome  G56 02                      Subjective: \"I still have tightness\"  Objective: See treatment diary below  [id number] [facility]  5734-2704 23' TE    Assessment: Tolerated treatment well  Patient has continued to report tightness in the digits  Therapist discussed discontinuing therapy until after they follow up with the surgeon on 23 to determine next plan of care as improvements have slowed  Patient in agreement    Plan: Waiting for orthopedics to determine next step     Precautions: Universal    Manuals 5/3 5/8 5/10 5/15 6/1 6/14 6/21 6/29 4/24 5/1   IASTM 8' 8' carpal tunnel 8' carpal tunnel 8' carpatunnel 8' thenar   8' 8' 8' 8'   taping  Carpal tunel Carpal Tunnel          Cupping             Re-evaluation      completed    completed   Neuro Re-Ed             Median nerve glide 8'        8'                                                                                  Ther Ex             HEP             1st Digit PROM 3' 3' 3' 4' 5' 5' 5' 4' 3'    Wrist PROM 3' 3' 8' 6'   2' 3' 3'    Wrist/Digit AROM             Opposition marble/abd             Wrist maze x5 x5 x5 x5    x5 x5 x5   TB on Wall x5 x5 x5 x5 x5 green ball x5 green x5 green x5 green x5 x5   Wrist isometrics             Digiflex R iso x10 G comp x20 R iso x10 G comp x20 R iso x10    G comp x20   R iso x 10  G comp x20    G iso x10    B comp x20 R iso x10 G comp x20  R iso x10 G comp x20   Hand Power Pro G center and rim x20 G center and rim x20 G center and rim x20  Green center and rim x20 Red center and rim x20 Red center and rim x20 Green center and rim x20 R x 20 center and rim G center and rim x20   Wrist Curls        #3 x 20 F/E     Thumb [place]   1 RB x 20  20x 20x 20x      Power Web Adduction   Yellow x 20          Ext Web Y " x10 Y x10 Y x10 Y x10 orange 20x orange 20x orange x20 O x 10 Y x 10 Y x10   Ther Activity             keypegs x1 x1 x1   x1 x1 x1 x1 x1 x1   [place] Sort             Pinch Rings R/R x1 R/R x1 R/R x1 half way  R/R x2 R/G x1 G/G x1 G/G x1 R/R x1 R/R x1   Velcro Pulls     1x x1       Jar turning      yellow 10x orange x10 Orange x10 and #2 Orange x10 and #2     Modalities             MHP 5' 5' w/ paraffin 5' 5' 5' 5' 5' 5' 5' 5'

## 2023-07-18 ENCOUNTER — OFFICE VISIT (OUTPATIENT)
Dept: OBGYN CLINIC | Facility: CLINIC | Age: 43
End: 2023-07-18
Payer: OTHER MISCELLANEOUS

## 2023-07-18 VITALS
SYSTOLIC BLOOD PRESSURE: 119 MMHG | BODY MASS INDEX: 23.3 KG/M2 | WEIGHT: 145 LBS | DIASTOLIC BLOOD PRESSURE: 79 MMHG | HEART RATE: 62 BPM | HEIGHT: 66 IN

## 2023-07-18 DIAGNOSIS — G56.02 CARPAL TUNNEL SYNDROME ON LEFT: Primary | ICD-10-CM

## 2023-07-18 DIAGNOSIS — M79.642 LEFT HAND PAIN: ICD-10-CM

## 2023-07-18 PROCEDURE — 99214 OFFICE O/P EST MOD 30 MIN: CPT | Performed by: STUDENT IN AN ORGANIZED HEALTH CARE EDUCATION/TRAINING PROGRAM

## 2023-07-18 RX ORDER — LIDOCAINE 40 MG/G
CREAM TOPICAL AS NEEDED
Qty: 28 G | Refills: 1 | Status: SHIPPED | OUTPATIENT
Start: 2023-07-18

## 2023-07-18 NOTE — LETTER
July 18, 2023     Patient: Margaret Brown  YOB: 1980  Date of Visit: 7/18/2023      To Whom it May Concern:    Margaret Brown is under my professional care. Harpal rodriguezjoshua was seen in my office on 7/18/2023. Harpal Rebollarjoshua may continue to work full duty with no restrictions. .    If you have any questions or concerns, please don't hesitate to call.          Sincerely,          Huan Manzano MD        CC: No Recipients

## 2023-07-18 NOTE — PROGRESS NOTES
ORTHOPAEDIC HAND, WRIST, AND ELBOW OFFICE  VISIT       ASSESSMENT/PLAN:      Diagnoses and all orders for this visit:    Carpal tunnel syndrome on left  -     MRI hand left wo contrast; Future    Left hand pain  -     MRI hand left wo contrast; Future  -     lidocaine (LMX) 4 % cream; Apply topically as needed for mild pain          41 y.o. female with left wrist carpal tunnel syndrome  Persistent symptoms despite hand therapy, injections and bracing  Reviewed EMG which demonstrates a normal study  Discussed use of lidocaine for web space pain. A prescription was sent for this today  Discussed MRI to further evaluate the hand. This was ordered today. Discussed potential for functional capacity exam for persistent symptoms if no clear pathology on MRI  She may continue working without restriction. Note provided for employer today. Follow Up: After testing       To Do Next Visit:  Re-evaluation of current issue      Discussions: The anatomy and physiology of the diagnosis(es) was(were) discussed with the patient today in the office. Treatment options and recommendations were discussed, as well as expected future outcomes. Jami Cote MD  Attending, Orthopaedic Surgery  Hand, Wrist, and Elbow Surgery  [address] [place] [address]    ____________________________________________________________________________________________________________________________________________      CHIEF COMPLAINT:  Chief Complaint   Patient presents with   • Follow-up     Patient is still having a lot of trouble making a fist and flexing her hand. SUBJECTIVE:  Patient is a 41 y.o. RHD female who presents today for follow up of left wrist carpal tunnel syndrome. At her last visit, she was given a thumb spica brace and a prescription for Voltaren gel and instructed to continue with occupational therapy. She did participate with OT but was discharged a few weeks ago.  She does report the numbness she had in her hand has resolved but she continues to experience "tightness" and a rubber band like sensation about the hand with attempts at full extension as well as pain in the web space.     I have personally reviewed all the relevant PMH, PSH, SH, FH, Medications and allergies      PAST MEDICAL HISTORY:  Past Medical History:   Diagnosis Date   • Fibroid        PAST SURGICAL HISTORY:  Past Surgical History:   Procedure Laterality Date   •  SECTION     • HAND SURGERY         FAMILY HISTORY:  Family History   Problem Relation Age of Onset   • Fibroids Sister    • No Known Problems Mother    • No Known Problems Father    • No Known Problems Daughter    • Breast cancer Maternal Grandmother 61   • No Known Problems Maternal Grandfather    • No Known Problems Paternal Grandmother    • No Known Problems Paternal Grandfather    • No Known Problems Sister    • No Known Problems Son    • No Known Problems Maternal Aunt    • No Known Problems Maternal Aunt    • No Known Problems Paternal Aunt        SOCIAL HISTORY:  Social History     Tobacco Use   • Smoking status: Never   • Smokeless tobacco: Never   Vaping Use   • Vaping Use: Never used   Substance Use Topics   • Alcohol use: Yes     Comment: socailly    • Drug use: Never       MEDICATIONS:    Current Outpatient Medications:   •  lidocaine (LMX) 4 % cream, Apply topically as needed for mild pain, Disp: 28 g, Rfl: 1  •  Diclofenac Sodium (VOLTAREN) 1 %, Apply 2 g topically 4 (four) times a day (Patient not taking: Reported on 2023), Disp: 100 g, Rfl: 1  •  methocarbamol (ROBAXIN) 500 mg tablet, Take 1 tablet (500 mg total) by mouth 2 (two) times a day as needed for muscle spasms (Patient not taking: Reported on 2023), Disp: 60 tablet, Rfl: 1  •  naproxen (NAPROSYN) 500 mg tablet, Take 500 mg by mouth 2 (two) times a day as needed (Patient not taking: Reported on 2022), Disp: , Rfl:     ALLERGIES:  No Known Allergies        REVIEW OF SYSTEMS:  Musculoskeletal: As noted in HPI. All other systems reviewed and are negative. VITALS:  Vitals:    07/18/23 1325   BP: 119/79   Pulse: 62       LABS:  HgA1c: No results found for: "HGBA1C"  BMP: No results found for: "GLUCOSE", "CALCIUM", "NA", "K", "CO2", "CL", "BUN", "CREATININE"    _____________________________________________________  PHYSICAL EXAMINATION:  General: Well developed and well nourished, alert & oriented x 3, appears comfortable  Psychiatric: Normal  HEENT: Normocephalic, Atraumatic Trachea Midline, No torticollis  Pulmonary: No audible wheezing or respiratory distress   Abdomen/GI: Non tender, non distended   Cardiovascular: No pitting edema, 2+ radial pulse   Skin: No masses, erythema, lacerations, fluctation, ulcerations  Neurovascular: Sensation Intact to the Median, Ulnar, Radial Nerve, Motor Intact to the Median, Ulnar, Radial Nerve and Pulses Intact  Musculoskeletal: Normal, except as noted in detailed exam and in HPI. MUSCULOSKELETAL EXAMINATION:  No pain with UCL stress testing.  Mild pain with RCL tenderness  Tenderness in 1st web space    ___________________________________________________  STUDIES REVIEWED:  No studies to review         PROCEDURES PERFORMED:  Procedures  No Procedures performed today    _____________________________________________________      Marlene Chris    I,:  Summre Mcpherson am acting as a scribe while in the presence of the attending physician.:       I,:  Arvind Hannon MD personally performed the services described in this documentation    as scribed in my presence.:

## 2023-07-28 ENCOUNTER — HOSPITAL ENCOUNTER (OUTPATIENT)
Dept: MRI IMAGING | Facility: HOSPITAL | Age: 43
Discharge: HOME | End: 2023-07-28
Attending: STUDENT IN AN ORGANIZED HEALTH CARE EDUCATION/TRAINING PROGRAM
Payer: OTHER MISCELLANEOUS

## 2023-07-28 DIAGNOSIS — M79.642 LEFT HAND PAIN: ICD-10-CM

## 2023-07-28 DIAGNOSIS — G56.02 CARPAL TUNNEL SYNDROME ON LEFT: ICD-10-CM

## 2023-07-28 PROCEDURE — G1004 CDSM NDSC: HCPCS

## 2023-07-28 PROCEDURE — 73218 MRI UPPER EXTREMITY W/O DYE: CPT

## 2023-08-03 ENCOUNTER — ANNUAL EXAM (OUTPATIENT)
Dept: OBGYN CLINIC | Facility: CLINIC | Age: 43
End: 2023-08-03
Payer: COMMERCIAL

## 2023-08-03 VITALS
HEIGHT: 66 IN | BODY MASS INDEX: 23.63 KG/M2 | SYSTOLIC BLOOD PRESSURE: 100 MMHG | WEIGHT: 147 LBS | DIASTOLIC BLOOD PRESSURE: 64 MMHG

## 2023-08-03 DIAGNOSIS — Z01.419 ENCOUNTER FOR GYNECOLOGICAL EXAMINATION WITHOUT ABNORMAL FINDING: Primary | ICD-10-CM

## 2023-08-03 DIAGNOSIS — Z11.51 SCREENING FOR HPV (HUMAN PAPILLOMAVIRUS): ICD-10-CM

## 2023-08-03 DIAGNOSIS — D25.9 UTERINE LEIOMYOMA, UNSPECIFIED LOCATION: ICD-10-CM

## 2023-08-03 PROCEDURE — G0145 SCR C/V CYTO,THINLAYER,RESCR: HCPCS | Performed by: PHYSICIAN ASSISTANT

## 2023-08-03 PROCEDURE — G0476 HPV COMBO ASSAY CA SCREEN: HCPCS | Performed by: PHYSICIAN ASSISTANT

## 2023-08-03 PROCEDURE — S0612 ANNUAL GYNECOLOGICAL EXAMINA: HCPCS | Performed by: PHYSICIAN ASSISTANT

## 2023-08-03 NOTE — PROGRESS NOTES
Assessment/Plan:    No problem-specific Assessment & Plan notes found for this encounter. Diagnoses and all orders for this visit:    Encounter for gynecological examination without abnormal finding  -     Liquid-based pap, screening    Uterine leiomyoma, unspecified location  -     US pelvis complete w transvaginal; Future    Screening for HPV (human papillomavirus)  -     Liquid-based pap, screening        Pap done. Order for mammogram already in [facility] [address]. Order for pelvic U/S entered to re-evaluate fibroids; we will call with results. Call if periods worsen or change. If no problems, patient to return in 1 year for routine gyn care. Subjective:      Patient ID: Zhen Gonzalez is a 41 y.o. female. Patient is here for yearly gyn exam.  States she is doing well overall. Periods are regular every 28 days, and bleeding lasts for 7 days. Flow is lighter than last year. Denies severe cramping, HA, and mood symptoms. She is sexually active with a monogamous partner; declines STD screening. They are interested in conceiving. Patient states she has a history of fibroids, but it has been some time since her last U/S. Would like to make sure they have not gotten larger. She denies bowel/bladder changes, pelvic pain, bloating, abdominal pain, n/v, change in appetite, and thyroid disease. Patient has mammogram scheduled for 8/22. She is performing self-breast exam.  Denies new masses, skin changes, nipple discharge, and pain/tenderness. The following portions of the patient's history were reviewed and updated as appropriate: allergies, current medications, past family history, past medical history, past social history, past surgical history and problem list.    Review of Systems   Constitutional: Negative for appetite change and unexpected weight change. Cardiovascular:        No masses, skin changes, nipple discharge, and pain/tenderness.    Gastrointestinal: Negative for abdominal distention, abdominal pain, constipation, diarrhea, nausea and vomiting. Genitourinary: Negative for difficulty urinating, dyspareunia, dysuria, frequency, genital sores, hematuria, menstrual problem, pelvic pain, urgency, vaginal bleeding, vaginal discharge and vaginal pain. Objective:      /64 (BP Location: Left arm, Patient Position: Sitting, Cuff Size: Adult)   Ht 5' 6" (1.676 m)   Wt 66.7 kg (147 lb)   LMP 07/13/2023 (Approximate)   BMI 23.73 kg/m²          Physical Exam  Vitals reviewed. Exam conducted with a chaperone present. Constitutional:       Appearance: Normal appearance. She is well-developed and normal weight. Neck:      Thyroid: No thyromegaly. Pulmonary:      Effort: Pulmonary effort is normal.   Chest:   Breasts:     Breasts are symmetrical.      Right: Normal. No swelling, bleeding, inverted nipple, mass, nipple discharge, skin change or tenderness. Left: Normal. No swelling, bleeding, inverted nipple, mass, nipple discharge, skin change or tenderness. Abdominal:      General: Abdomen is flat. There is no distension. Palpations: Abdomen is soft. Tenderness: There is no abdominal tenderness. Genitourinary:     General: Normal vulva. Pubic Area: No rash. Labia:         Right: No rash, tenderness, lesion or injury. Left: No rash, tenderness, lesion or injury. Vagina: Normal. No vaginal discharge, erythema, tenderness or bleeding. Cervix: Normal.      Uterus: Normal.       Adnexa: Right adnexa normal and left adnexa normal.        Right: No mass, tenderness or fullness. Left: No mass, tenderness or fullness. Musculoskeletal:      Cervical back: Neck supple. Lymphadenopathy:      Cervical: No cervical adenopathy. Upper Body:      Right upper body: No supraclavicular or axillary adenopathy. Left upper body: No supraclavicular or axillary adenopathy. Lower Body: No right inguinal adenopathy.  No left inguinal adenopathy. Skin:     General: Skin is warm and dry. Neurological:      Mental Status: She is alert and oriented to person, place, and time. Psychiatric:         Mood and Affect: Mood normal.         Behavior: Behavior normal. Behavior is cooperative. Thought Content:  Thought content normal.         Judgment: Judgment normal.

## 2023-08-09 ENCOUNTER — HOSPITAL ENCOUNTER (OUTPATIENT)
Dept: ULTRASOUND IMAGING | Facility: HOSPITAL | Age: 43
Discharge: HOME | End: 2023-08-09
Payer: COMMERCIAL

## 2023-08-09 DIAGNOSIS — D25.9 UTERINE LEIOMYOMA, UNSPECIFIED LOCATION: ICD-10-CM

## 2023-08-09 PROCEDURE — 76830 TRANSVAGINAL US NON-OB: CPT

## 2023-08-09 PROCEDURE — 76856 US EXAM PELVIC COMPLETE: CPT

## 2023-08-14 ENCOUNTER — TELEPHONE (OUTPATIENT)
Dept: OBGYN CLINIC | Facility: CLINIC | Age: 43
End: 2023-08-14

## 2023-08-14 DIAGNOSIS — A59.9 TRICHOMONAL INFECTION: Primary | ICD-10-CM

## 2023-08-14 LAB
LAB AP GYN PRIMARY INTERPRETATION: NORMAL
Lab: NORMAL
PATH INTERP SPEC-IMP: NORMAL

## 2023-08-14 RX ORDER — METRONIDAZOLE 500 MG/1
500 TABLET ORAL EVERY 12 HOURS SCHEDULED
Qty: 14 TABLET | Refills: 0 | Status: SHIPPED | OUTPATIENT
Start: 2023-08-14 | End: 2023-08-21

## 2023-08-14 NOTE — TELEPHONE ENCOUNTER
Spoke with patient regarding Pap results. Cytology negative, but showed presence of trichomonas. Rx for metronidazole 500 mg BID x 7 days sent to pharmacy. Instructed patient to avoid alcohol while on abx. Partner needs to be treated as well. Abstain from intercourse until both finished medication. F/u in 2 mos for test of cure; will schedule today. Call with problems. Still waiting on U/S report.

## 2023-08-15 ENCOUNTER — OFFICE VISIT (OUTPATIENT)
Dept: OBGYN CLINIC | Facility: CLINIC | Age: 43
End: 2023-08-15
Payer: OTHER MISCELLANEOUS

## 2023-08-15 VITALS — HEIGHT: 66 IN | BODY MASS INDEX: 23.63 KG/M2 | WEIGHT: 147 LBS

## 2023-08-15 DIAGNOSIS — G56.02 CARPAL TUNNEL SYNDROME ON LEFT: Primary | ICD-10-CM

## 2023-08-15 PROCEDURE — 20605 DRAIN/INJ JOINT/BURSA W/O US: CPT | Performed by: STUDENT IN AN ORGANIZED HEALTH CARE EDUCATION/TRAINING PROGRAM

## 2023-08-15 PROCEDURE — 99214 OFFICE O/P EST MOD 30 MIN: CPT | Performed by: STUDENT IN AN ORGANIZED HEALTH CARE EDUCATION/TRAINING PROGRAM

## 2023-08-15 RX ADMIN — BUPIVACAINE HYDROCHLORIDE 1 ML: 2.5 INJECTION, SOLUTION EPIDURAL; INFILTRATION; INTRACAUDAL at 09:30

## 2023-08-15 RX ADMIN — BETAMETHASONE SODIUM PHOSPHATE AND BETAMETHASONE ACETATE 6 MG: 3; 3 INJECTION, SUSPENSION INTRA-ARTICULAR; INTRALESIONAL; INTRAMUSCULAR; SOFT TISSUE at 09:30

## 2023-08-15 NOTE — PROGRESS NOTES
ORTHOPAEDIC HAND, WRIST, AND ELBOW OFFICE  VISIT       ASSESSMENT/PLAN:      Diagnoses and all orders for this visit:    Carpal tunnel syndrome on left  -     Medium joint arthrocentesis: L intercarpal  -     bupivacaine (PF) (MARCAINE) 0.25 % injection 1 mL  -     betamethasone acetate-betamethasone sodium phosphate (CELESTONE) injection 6 mg          41 y.o. female with left carpal tunnel syndrome  Patient has persistent symptoms following attempt of extended PT/OT Hand Therapy, steroid injections, OTC medications, voltaren gel, and bracing. However, we discussed that her aching in the hand is not typical of CTS, which typically involves numbness and tingling. Because the patient obtained months of relief from her last injection on 3/14/23, I believe another injection may be beneficial today. If the patient continues to have pain, we can consider a carpal tunnel release, but again, given her symptom pattern, she may not find long-term benefits from this procedure. Treatment options and expected outcomes were discussed. The patient verbalized understanding of exam findings and treatment plan. The patient was given the opportunity to ask questions. Questions were answered to the patient's satisfaction. The patient decided to move forward with another injection via shared decision making. Follow Up:  6 weeks       To Do Next Visit:  Re-evaluation of current issue      Discussions:  Carpal Tunnel Syndrome: The anatomy and physiology of carpal tunnel syndrome was discussed with the patient today. Increase pressure localized under the transverse carpal ligament can cause pain, numbness, tingling, or dysesthesias within the median nerve distribution as well as feelings of fatigue, clumsiness, or awkwardness. These symptoms typically occur at night and worse in the morning upon waking.   Eventually, untreated carpal tunnel syndrome can result in weakness and permanent loss of muscle within the thenar compartment of the hand. Treatment options were discussed with the patient. Conservative treatment includes nocturnal resting splints to keep the nerve in a neutral position, ergonomic changes within the work or home environment, activity modification, and tendon gliding exercises. Vitamin B6 one tablet daily over the counter may helpful to reduce symptoms. Steroid injections within the carpal canal can help a majority of patients, however this is often self-limited in a majority of patients. Surgical intervention to divide the transverse carpal ligament typically results in a long-lasting relief of the patient's complaints, with the recurrence rate of less than 1%. Jami Cote MD  Attending, Orthopaedic Surgery  Hand, Wrist, and Elbow Surgery  [facility] [place] [facility]    ____________________________________________________________________________________________________________________________________________      CHIEF COMPLAINT:  Chief Complaint   Patient presents with   • Left Hand - Follow-up       SUBJECTIVE:  Patient is a 41 y.o. RHD female who presents today for follow up of left wrist carpal tunnel syndrome and left hand MRI review. The patient continues to report a "rubber band pulling" sensation along the thumb and 1st dorsal compartment as well as difficulty with thumb abduction. She denies numbness/tingling, swelling, or new injury. The patient does report months of symptomatic improvement following her steroid injection on 3/14/23. So far, she has also tried OT, thumb spica, voltaren gel, and OTC medications without complete resolution of pain.            I have personally reviewed all the relevant PMH, PSH, SH, FH, Medications and allergies      PAST MEDICAL HISTORY:  Past Medical History:   Diagnosis Date   • Fibroid        PAST SURGICAL HISTORY:  Past Surgical History:   Procedure Laterality Date   •  SECTION     • HAND SURGERY         FAMILY HISTORY:  Family History   Problem Relation Age of Onset   • Fibroids Sister    • No Known Problems Mother    • No Known Problems Father    • No Known Problems Daughter    • Breast cancer Maternal Grandmother 61   • No Known Problems Maternal Grandfather    • No Known Problems Paternal Grandmother    • No Known Problems Paternal Grandfather    • No Known Problems Sister    • No Known Problems Son    • No Known Problems Maternal Aunt    • No Known Problems Maternal Aunt    • No Known Problems Paternal Aunt        SOCIAL HISTORY:  Social History     Tobacco Use   • Smoking status: Never   • Smokeless tobacco: Never   Vaping Use   • Vaping Use: Never used   Substance Use Topics   • Alcohol use: Yes     Comment: socailly    • Drug use: Never       MEDICATIONS:    Current Outpatient Medications:   •  lidocaine (LMX) 4 % cream, Apply topically as needed for mild pain, Disp: 28 g, Rfl: 1  •  metroNIDAZOLE (FLAGYL) 500 mg tablet, Take 1 tablet (500 mg total) by mouth every 12 (twelve) hours for 7 days, Disp: 14 tablet, Rfl: 0  •  Diclofenac Sodium (VOLTAREN) 1 %, Apply 2 g topically 4 (four) times a day (Patient not taking: Reported on 2023), Disp: 100 g, Rfl: 1  •  methocarbamol (ROBAXIN) 500 mg tablet, Take 1 tablet (500 mg total) by mouth 2 (two) times a day as needed for muscle spasms (Patient not taking: Reported on 2023), Disp: 60 tablet, Rfl: 1  •  naproxen (NAPROSYN) 500 mg tablet, Take 500 mg by mouth 2 (two) times a day as needed (Patient not taking: Reported on 2022), Disp: , Rfl:   No current facility-administered medications for this visit. ALLERGIES:  No Known Allergies        REVIEW OF SYSTEMS:  Musculoskeletal:        As noted in HPI. All other systems reviewed and are negative.     VITALS:  There were no vitals filed for this visit.    LABS:  HgA1c: No results found for: "HGBA1C"  BMP: No results found for: "GLUCOSE", "CALCIUM", "NA", "K", "CO2", "CL", "BUN", "CREATININE"    _____________________________________________________  PHYSICAL EXAMINATION:  General: Well developed and well nourished, alert & oriented x 3, appears comfortable  Psychiatric: Normal  HEENT: Normocephalic, Atraumatic Trachea Midline, No torticollis  Pulmonary: No audible wheezing or respiratory distress   Abdomen/GI: Non tender, non distended   Cardiovascular: No pitting edema, 2+ radial pulse   Skin: No masses, erythema, lacerations, fluctation, ulcerations  Neurovascular: Sensation Intact to the Median, Ulnar, Radial Nerve, Motor Intact to the Median, Ulnar, Radial Nerve and Pulses Intact Motor intact to the median, ulnar, and radial nerves. Sensation intact to ulnar and radial nerves. Decreased sensation to light touch along the median nerve distribution. Pulses intact  Musculoskeletal: Normal, except as noted in detailed exam and in HPI. MUSCULOSKELETAL EXAMINATION:  Left Hand:  Full, painless wrist radial/ulnar deviation, flexion, and extension  Tender within the web space  + Phalen's  + Tinel's at the carpal tunnel  - Finkelstein's    ___________________________________________________  STUDIES REVIEWED:  MRI of the left hand was reviewed and independently interpreted in PACS by Dr. Claus Edmond and demonstrates tiny intraosseous ganglion along the radial aspect of the second metacarpal head. Otherwise, no acute findings. EMG of left limb reviewed in the Notes tab from 2/8/23, which did not show evidence of carpal tunnel syndrome        PROCEDURES PERFORMED:    Medium joint arthrocentesis: L intercarpal  Universal Protocol:  Consent: Verbal consent obtained.   Risks and benefits: risks, benefits and alternatives were discussed  Consent given by: patient  Timeout called at: 8/15/2023 10:58 AM.  Patient understanding: patient states understanding of the procedure being performed  Patient consent: the patient's understanding of the procedure matches consent given  Site marked: the operative site was marked  Radiology Images displayed and confirmed.  If images not available, report reviewed: imaging studies available  Patient identity confirmed: verbally with patient    Supporting Documentation  Indications: pain   Procedure Details  Location: wrist - L intercarpal  Needle size: 22 G  Ultrasound guidance: no  Approach: volar  Medications administered: 1 mL bupivacaine (PF) 0.25 %; 6 mg betamethasone acetate-betamethasone sodium phosphate 6 (3-3) mg/mL    Patient tolerance: patient tolerated the procedure well with no immediate complications  Dressing:  Sterile dressing applied          _____________________________________________________      Scribe Attestation    I,:  Vivienne Paredes PA-C am acting as a scribe while in the presence of the attending physician.:       I,:  Stephon Saba MD personally performed the services described in this documentation    as scribed in my presence.:

## 2023-08-16 ENCOUNTER — TELEPHONE (OUTPATIENT)
Dept: OBGYN CLINIC | Facility: CLINIC | Age: 43
End: 2023-08-16

## 2023-08-16 RX ORDER — BETAMETHASONE SODIUM PHOSPHATE AND BETAMETHASONE ACETATE 3; 3 MG/ML; MG/ML
6 INJECTION, SUSPENSION INTRA-ARTICULAR; INTRALESIONAL; INTRAMUSCULAR; SOFT TISSUE
Status: COMPLETED | OUTPATIENT
Start: 2023-08-15 | End: 2023-08-15

## 2023-08-16 RX ORDER — BUPIVACAINE HYDROCHLORIDE 2.5 MG/ML
1 INJECTION, SOLUTION EPIDURAL; INFILTRATION; INTRACAUDAL
Status: COMPLETED | OUTPATIENT
Start: 2023-08-15 | End: 2023-08-15

## 2023-08-16 NOTE — TELEPHONE ENCOUNTER
Spoke with patient regarding pelvic U/S results. Three fibroids seen in the uterus in three separate positions. 1 cm CL cyst seen on R ovary. 1.6 cm paraovarian cyst seen near L ovary; needs f/u imaging in a year. Large volume complex fluid collection seen in cul-de-sac with septations seen. Could be peritoneal inclusion cyst.  Recommended surgical consult with Dr Omar Simpson as patient is interested in pregnancy. She will schedule today. Call with further questions.

## 2023-08-22 ENCOUNTER — HOSPITAL ENCOUNTER (OUTPATIENT)
Dept: MAMMOGRAPHY | Facility: HOSPITAL | Age: 43
Discharge: HOME | End: 2023-08-22
Payer: COMMERCIAL

## 2023-08-22 VITALS — BODY MASS INDEX: 23.63 KG/M2 | HEIGHT: 66 IN | WEIGHT: 147.05 LBS

## 2023-08-22 DIAGNOSIS — Z12.31 ENCOUNTER FOR SCREENING MAMMOGRAM FOR BREAST CANCER: ICD-10-CM

## 2023-08-22 PROCEDURE — 77067 SCR MAMMO BI INCL CAD: CPT

## 2023-08-22 PROCEDURE — 77063 BREAST TOMOSYNTHESIS BI: CPT

## 2023-09-26 ENCOUNTER — OFFICE VISIT (OUTPATIENT)
Dept: OBGYN CLINIC | Facility: CLINIC | Age: 43
End: 2023-09-26
Payer: OTHER MISCELLANEOUS

## 2023-09-26 VITALS — HEIGHT: 66 IN | WEIGHT: 147 LBS | BODY MASS INDEX: 23.63 KG/M2

## 2023-09-26 DIAGNOSIS — G56.02 CARPAL TUNNEL SYNDROME ON LEFT: Primary | ICD-10-CM

## 2023-09-26 PROCEDURE — 99213 OFFICE O/P EST LOW 20 MIN: CPT | Performed by: STUDENT IN AN ORGANIZED HEALTH CARE EDUCATION/TRAINING PROGRAM

## 2023-09-26 NOTE — LETTER
September 26, 2023     Patient: Amish Andrade  YOB: 1980  Date of Visit: 9/26/2023      To Whom it May Concern:    Amish Andrade is under my professional care. Mary Cagle was seen in my office on 9/26/2023. Mary Cagle may continue to work without restrictions. If you have any questions or concerns, please don't hesitate to call.          Sincerely,          La Nena Orellana MD

## 2023-09-26 NOTE — PROGRESS NOTES
ORTHOPAEDIC HAND, WRIST, AND ELBOW OFFICE  VISIT       ASSESSMENT/PLAN:      Diagnoses and all orders for this visit:    Carpal tunnel syndrome on left          41 y.o. female with left median nerve neuritis (no evidence of carpal tunnel syndrome on EMG)  Overall Kaylan Boucher is doing well   Symptoms have improved at least 80 percent since undergoing a repeat left carpal tunnel CSI   Currently working without restrictions, updated note was provided    We did discuss the possibility of a repeat left carpal tunnel CSI vs carpal tunnel release if symptoms worsen or feel to improve with the understanding symptoms may not improve or resolve as there is no evidence of carpal tunnel syndrome on EMG  We also discussed returning to OT to help with the tightness, she declined at this time   Follow up in 6 weeks time for re-evaluation       Follow Up:  6 weeks       To Do Next Visit:  Re-evaluation of current issue    Justen Burch MD  Attending, Orthopaedic Surgery  Hand, Wrist, and Elbow Surgery  [facility]    ____________________________________________________________________________________________________________________________________________      CHIEF COMPLAINT:  Chief Complaint   Patient presents with   • Left Wrist - Follow-up       SUBJECTIVE:  Patient is a 41 y.o. RHD female who presents today for follow up of left carpal tunnel syndrome. She was last seen in the office on 8/15/23, at which time she underwent a repeat left carpal tunnel CSI. Overall Kaylan Boucher is doing well. She feels her symptoms improved approx. 85 percent since undergoing the CSI. She feels the CSI may be starting to wear off as she is starting to get some tightness.            I have personally reviewed all the relevant PMH, PSH, SH, FH, Medications and allergies      PAST MEDICAL HISTORY:  Past Medical History:   Diagnosis Date   • Fibroid        PAST SURGICAL HISTORY:  Past Surgical History:   Procedure Laterality Date   •  SECTION     • HAND SURGERY         FAMILY HISTORY:  Family History   Problem Relation Age of Onset   • No Known Problems Mother    • No Known Problems Father    • Fibroids Sister    • No Known Problems Sister    • No Known Problems Daughter    • Breast cancer Maternal Grandmother 61   • No Known Problems Maternal Grandfather    • No Known Problems Paternal Grandmother    • No Known Problems Paternal Grandfather    • No Known Problems Son    • No Known Problems Maternal Aunt    • No Known Problems Maternal Aunt    • No Known Problems Paternal Aunt        SOCIAL HISTORY:  Social History     Tobacco Use   • Smoking status: Never   • Smokeless tobacco: Never   Vaping Use   • Vaping Use: Never used   Substance Use Topics   • Alcohol use: Yes     Comment: socailly    • Drug use: Never       MEDICATIONS:    Current Outpatient Medications:   •  Diclofenac Sodium (VOLTAREN) 1 %, Apply 2 g topically 4 (four) times a day (Patient not taking: Reported on 2023), Disp: 100 g, Rfl: 1  •  lidocaine (LMX) 4 % cream, Apply topically as needed for mild pain (Patient not taking: Reported on 2023), Disp: 28 g, Rfl: 1  •  methocarbamol (ROBAXIN) 500 mg tablet, Take 1 tablet (500 mg total) by mouth 2 (two) times a day as needed for muscle spasms (Patient not taking: Reported on 2023), Disp: 60 tablet, Rfl: 1  •  naproxen (NAPROSYN) 500 mg tablet, Take 500 mg by mouth 2 (two) times a day as needed (Patient not taking: Reported on 2022), Disp: , Rfl:     ALLERGIES:  No Known Allergies        REVIEW OF SYSTEMS:  Musculoskeletal:        As noted in HPI. All other systems reviewed and are negative. VITALS:  There were no vitals filed for this visit.     LABS:  HgA1c: No results found for: "HGBA1C"  BMP: No results found for: "GLUCOSE", "CALCIUM", "NA", "K", "CO2", "CL", "BUN", "CREATININE"    _____________________________________________________  PHYSICAL EXAMINATION:  General: Well developed and well nourished, alert & oriented x 3, appears comfortable  Psychiatric: Normal  HEENT: Normocephalic, Atraumatic Trachea Midline, No torticollis  Pulmonary: No audible wheezing or respiratory distress   Abdomen/GI: Non tender, non distended   Cardiovascular: No pitting edema, 2+ radial pulse   Skin: No masses, erythema, lacerations, fluctation, ulcerations  Neurovascular: Sensation Intact to the Median, Ulnar, Radial Nerve, Motor Intact to the Median, Ulnar, Radial Nerve and Pulses Intact  Musculoskeletal: Normal, except as noted in detailed exam and in HPI.       MUSCULOSKELETAL EXAMINATION:  Left wrist:   No erythema, ecchymosis or edema  Full wrist ROM   - tinel's at the carpal tunnel   Full composite fist     ___________________________________________________  STUDIES REVIEWED:  No studies to review         PROCEDURES PERFORMED:  Procedures  No Procedures performed today    _____________________________________________________      Madison Manner    I,:  Fuller Bloch Ditmars am acting as a scribe while in the presence of the attending physician.:       I,:  Jaelyn Harris MD personally performed the services described in this documentation    as scribed in my presence.:

## 2023-11-06 NOTE — PROGRESS NOTES
"Daily Note     Today's date: 2023  Patient name: Sascha Andrews  : 1980  MRN: [id number]  Referring provider: Paul Reyes DO  Dx:   Encounter Diagnosis     ICD-10-CM    1  Left carpal tunnel syndrome  G56 02                      Subjective: \"I have less numbness and tingling and pain\"  \"I'm off work this week so that's probably why it feels better\"  Objective: See treatment diary below      Assessment: Tolerated treatment well  Patient reported decreased numbness and tingling on this date  Patient tolerated all exercises and activities well with only slight fatigue noted at the end of the session  Therapist added additional resistance exercises on this date as patient responded well to ROM and fine motor activities  Plan: Continue per plan of care  Progress treatment as tolerated         Precautions: Universal    Manuals 3/23/23 3/29 3/31 4/6         IASTM  8' carpal tunnel 8' carpal tunnel 8' carpatunnel         taping             Cupping   carpal tunnel                       Neuro Re-Ed             Median nerve glide                                                                                           Ther Ex             HEP Wrist AROM    Tendon glide    opposition              Wrist PROM   8' 8'         Wrist/Digit AROM  x10 each           Opposition marble/abd  x20 each x20 each          Wrist maze  x5 x5 x5         TB on Wall  x5 x5 x5         Wrist isometrics             Digiflex   Y iso x 10   Y iso x 10  R comp x20         Hand Power Pro    Y x 20 center and rim         Ext Web    Y x10         Ther Activity             keypegs  x1 x1 half x1         Colored pegs             Pinch Rings   Y/R x1 half way Y/R x1         Velcro Pulls    x1                      Modalities             MHP  5' 5' 5'                               " zef

## 2024-03-06 ENCOUNTER — TELEPHONE (OUTPATIENT)
Dept: OBGYN CLINIC | Facility: HOSPITAL | Age: 44
End: 2024-03-06

## 2024-03-06 NOTE — TELEPHONE ENCOUNTER
Caller: Patient    Doctor: Nathaly    Reason for call: Patient was treating with you for her left wrist carpal tunnel.  On March 31, 2023 you had referred her to [facility] regarding her neck pain that might have been stemming from the left wrist pain.  WC needs a letter from you stating that you did refer her and it is related to the same [facility] claim so that her claims from them can be paid.Call patient when completed [phone] or with any questions.    Call back#: [phone]

## 2024-04-05 NOTE — LETTER
August 15, 2023     Patient: Margaret Brown  YOB: 1980  Date of Visit: 8/15/2023      To Whom it May Concern:    Margaret Brown is under my professional care. Elie Snellen was seen in my office on 8/15/2023. Elie Snellen may continue to work full duty without any restrictions. If you have any questions or concerns, please don't hesitate to call.          Sincerely,          Huan Manzano MD        CC: No Recipients
none

## 2024-08-14 ENCOUNTER — ANNUAL EXAM (OUTPATIENT)
Dept: OBGYN CLINIC | Facility: CLINIC | Age: 44
End: 2024-08-14
Payer: COMMERCIAL

## 2024-08-14 VITALS
BODY MASS INDEX: 26.84 KG/M2 | SYSTOLIC BLOOD PRESSURE: 118 MMHG | HEIGHT: 66 IN | DIASTOLIC BLOOD PRESSURE: 72 MMHG | WEIGHT: 167 LBS

## 2024-08-14 DIAGNOSIS — N83.202 LEFT OVARIAN CYST: ICD-10-CM

## 2024-08-14 DIAGNOSIS — Z12.31 ENCOUNTER FOR SCREENING MAMMOGRAM FOR BREAST CANCER: ICD-10-CM

## 2024-08-14 DIAGNOSIS — A59.9 TRICHOMONAL INFECTION: ICD-10-CM

## 2024-08-14 DIAGNOSIS — Z01.411 ENCOUNTER FOR GYNECOLOGICAL EXAMINATION WITH ABNORMAL FINDING: Primary | ICD-10-CM

## 2024-08-14 PROCEDURE — G0145 SCR C/V CYTO,THINLAYER,RESCR: HCPCS | Performed by: PHYSICIAN ASSISTANT

## 2024-08-14 PROCEDURE — 87480 CANDIDA DNA DIR PROBE: CPT | Performed by: PHYSICIAN ASSISTANT

## 2024-08-14 PROCEDURE — 87510 GARDNER VAG DNA DIR PROBE: CPT | Performed by: PHYSICIAN ASSISTANT

## 2024-08-14 PROCEDURE — 87660 TRICHOMONAS VAGIN DIR PROBE: CPT | Performed by: PHYSICIAN ASSISTANT

## 2024-08-14 PROCEDURE — G0476 HPV COMBO ASSAY CA SCREEN: HCPCS | Performed by: PHYSICIAN ASSISTANT

## 2024-08-14 PROCEDURE — S0612 ANNUAL GYNECOLOGICAL EXAMINA: HCPCS | Performed by: PHYSICIAN ASSISTANT

## 2024-08-14 NOTE — PROGRESS NOTES
Assessment/Plan:      Diagnoses and all orders for this visit:    Encounter for gynecological examination with abnormal finding  -     Liquid-based pap, screening    Trichomonal infection  -     VAGINOSIS DNA PROBE    Left ovarian cyst  -     US pelvis complete w transvaginal; Future    Encounter for screening mammogram for breast cancer  -     Mammo screening bilateral w 3d & cad; Future        Pap and vaginal cultures done for CHATO of trichomonas.  We will call with results. Orders for mammogram and repeat pelvic U/S entered.  Patient to consider options for treatment of menorrhagia.  Call if periods worsen or change.  If no problems, patient to return in 1 year for routine gyn care.    Subjective:     Patient ID: Marcelo Duran is a 44 y.o. female.    Patient is here for yearly gyn exam.  States she is doing well overall.  Periods are regular every 28 days, and bleeding lasts for 7 days.  Heavy flow is stable; denies severe cramping.  Pelvic U/S last year showed three fibroids, the largest of which was 3 cm.  There was also a L paraovarian cyst and a possible peritoneal inclusion cyst.  She is due for repeat imaging.  Pap last year showed trichomonal infection.  Patient finished abx, but did not have CHATO.  She notes some urinary frequency recently.  Denies bowel changes, pelvic pain, bloating, abdominal pain, n/v, change in appetite, and thyroid disease.    Due for mammogram.  Patient is performing self-breast exam.  Denies new masses, skin changes, nipple discharge, and pain/tenderness.        Review of Systems   Constitutional:  Negative for appetite change and unexpected weight change.   Cardiovascular:         No masses, skin changes, nipple discharge, and pain/tenderness.   Gastrointestinal:  Negative for abdominal distention, abdominal pain, constipation, diarrhea, nausea and vomiting.   Genitourinary:  Positive for frequency and menstrual problem (Menorrhagia). Negative for difficulty urinating, dysuria,  "genital sores, hematuria, pelvic pain, urgency, vaginal bleeding, vaginal discharge and vaginal pain.         Objective:  Visit Vitals  /72 (BP Location: Left arm, Patient Position: Sitting, Cuff Size: Adult)   Ht 5' 6\" (1.676 m)   Wt 75.8 kg (167 lb)   LMP 08/03/2024   BMI 26.95 kg/m²   OB Status Unknown   Smoking Status Never   BSA 1.85 m²         Physical Exam  Vitals reviewed. Exam conducted with a chaperone present.   Constitutional:       Appearance: Normal appearance. She is well-developed.   Neck:      Thyroid: No thyromegaly.   Pulmonary:      Effort: Pulmonary effort is normal.   Chest:   Breasts:     Breasts are symmetrical.      Right: Normal. No swelling, bleeding, inverted nipple, mass, nipple discharge, skin change or tenderness.      Left: Normal. No swelling, bleeding, inverted nipple, mass, nipple discharge, skin change or tenderness.   Abdominal:      General: There is no distension.      Palpations: Abdomen is soft.      Tenderness: There is no abdominal tenderness.   Genitourinary:     General: Normal vulva.      Pubic Area: No rash.       Labia:         Right: No rash, tenderness, lesion or injury.         Left: No rash, tenderness, lesion or injury.       Vagina: Normal. No vaginal discharge, erythema, tenderness or bleeding.      Cervix: Normal.      Uterus: Normal.       Adnexa: Right adnexa normal and left adnexa normal.        Right: No mass, tenderness or fullness.          Left: No mass, tenderness or fullness.     Musculoskeletal:      Cervical back: Neck supple.   Lymphadenopathy:      Cervical: No cervical adenopathy.      Upper Body:      Right upper body: No supraclavicular or axillary adenopathy.      Left upper body: No supraclavicular or axillary adenopathy.      Lower Body: No right inguinal adenopathy. No left inguinal adenopathy.   Skin:     General: Skin is warm and dry.   Neurological:      Mental Status: She is alert and oriented to person, place, and time. "   Psychiatric:         Behavior: Behavior normal. Behavior is cooperative.         Thought Content: Thought content normal.         Judgment: Judgment normal.

## 2024-08-14 NOTE — PATIENT INSTRUCTIONS
Go for mammogram and pelvic ultrasound.    Consider options for treatment of heavy periods.    Call if periods worsen or change.

## 2024-08-15 ENCOUNTER — TELEPHONE (OUTPATIENT)
Dept: OBGYN CLINIC | Facility: CLINIC | Age: 44
End: 2024-08-15

## 2024-08-15 DIAGNOSIS — N76.0 BV (BACTERIAL VAGINOSIS): Primary | ICD-10-CM

## 2024-08-15 DIAGNOSIS — B96.89 BV (BACTERIAL VAGINOSIS): Primary | ICD-10-CM

## 2024-08-15 LAB
CANDIDA RRNA VAG QL PROBE: NOT DETECTED
G VAGINALIS RRNA GENITAL QL PROBE: DETECTED
HPV HR 12 DNA CVX QL NAA+PROBE: NEGATIVE
HPV16 DNA CVX QL NAA+PROBE: NEGATIVE
HPV18 DNA CVX QL NAA+PROBE: NEGATIVE
T VAGINALIS RRNA GENITAL QL PROBE: NOT DETECTED

## 2024-08-15 RX ORDER — METRONIDAZOLE 500 MG/1
500 TABLET ORAL EVERY 12 HOURS SCHEDULED
Qty: 14 TABLET | Refills: 0 | Status: SHIPPED | OUTPATIENT
Start: 2024-08-15 | End: 2024-08-22

## 2024-08-15 NOTE — TELEPHONE ENCOUNTER
Spoke with patient regarding vaginal culture results - positive for BV.  Trichomonas was negative.  Rx for metronidazole 500 mg BID x 7 days sent to pharmacy.  Instructed patient to avoid alcohol while on abx.  Call with problems.

## 2024-08-19 ENCOUNTER — HOSPITAL ENCOUNTER (OUTPATIENT)
Dept: ULTRASOUND IMAGING | Facility: HOSPITAL | Age: 44
Discharge: HOME | End: 2024-08-19
Payer: COMMERCIAL

## 2024-08-19 DIAGNOSIS — N83.202 LEFT OVARIAN CYST: ICD-10-CM

## 2024-08-19 PROCEDURE — 76856 US EXAM PELVIC COMPLETE: CPT

## 2024-08-19 PROCEDURE — 76830 TRANSVAGINAL US NON-OB: CPT

## 2024-08-21 LAB
LAB AP GYN PRIMARY INTERPRETATION: NORMAL
Lab: NORMAL
PATH INTERP SPEC-IMP: NORMAL

## 2024-08-22 ENCOUNTER — TELEPHONE (OUTPATIENT)
Age: 44
End: 2024-08-22

## 2024-08-22 NOTE — TELEPHONE ENCOUNTER
Yesi from [facility] radiology calling in to report significant findings on pelvic ultrasound.   High priority message sent to provider and clinical pool as notification.

## 2024-08-22 NOTE — TELEPHONE ENCOUNTER
Krysten is out of office.  I did review the pelvic ultrasound report and contacted patient to review results with her together.  Will discuss case further with Krysten to see if we should consider follow-up ultrasound in 3-6 months versus pelvic MRI.  Also considering patient is interested in conceiving at present.  May also benefit from surgical consultation with Dr. Isabel condon.  Will review with Krysten when she returns early next week to finalize plan and will reach back out to patient.  Patient is aware of results and agreeable to plan at present.

## 2024-08-22 NOTE — TELEPHONE ENCOUNTER
----- Message from Nat Gonzalez PA-C sent at 8/22/2024  8:32 AM EDT -----  Staff, please call Krysten's patient with results, I am covering for her this week.  Please let her know her Pap smear and HPV screening were all negative!  Pap smear did  a decrease or shift in the vaginal poppy suggestive of BV.  Her recent previous vaginal culture was positive for BV, Krysten already sent in medication for her.  Please be sure patient was able to  the medication and start it and she is seeing improvement in symptoms.  Thank you

## 2024-08-22 NOTE — TELEPHONE ENCOUNTER
Spoke with patient regarding results. She has picked up the medication and noticed an improvement in symptoms. She is inquiring about the results of her recent ultrasound. Reviewed does not appear results have been reviewed by provider yet. Message sent to Krysten for review.

## 2024-08-22 NOTE — TELEPHONE ENCOUNTER
"I called and spoke to patient directly regarding her ultrasound results.  Results as follows:  Myomatous uterus.  2. Previously described possible peritoneal inclusion cyst not visualized.  3. Cystic structure in the right adnexa which could represent hydrosalpinx.  Recommend follow-up evaluation with pelvic ultrasound in 3 to 6 months interval. Alternatively MRI pelvis would help characterize the adnexal findings.  The study was marked in EPIC for significant notification.\"    Discussed what possible hydrosalpinx and its potential causes.  She has no pain.  Discussed consideration for follow-up pelvic ultrasound versus sooner MRI to better characterize.  Would prefer to review with Krysten to finalize plan prior.  Also concern patient does desire pregnancy and reports she is actively trying to conceive at this time.  She may benefit from surgical consult with Dr. Hall as well.  Patient agreeable to wait finalize plan early next week.    FANNY Bashir  "

## 2024-08-29 ENCOUNTER — TELEPHONE (OUTPATIENT)
Dept: OBGYN CLINIC | Facility: CLINIC | Age: 44
End: 2024-08-29

## 2024-08-29 NOTE — TELEPHONE ENCOUNTER
Spoke with patient regarding pelvic U/S results.  Multiple fibroids seen in the uterus, as well as R hydrosalpinx.  Patient is still desirous of fertility if possible.  Recommended consult with Dr Hall to discuss; she will schedule today.  Call with further questions.

## 2024-09-09 ENCOUNTER — OFFICE VISIT (OUTPATIENT)
Dept: OBGYN CLINIC | Facility: CLINIC | Age: 44
End: 2024-09-09
Payer: COMMERCIAL

## 2024-09-09 VITALS
HEIGHT: 66 IN | BODY MASS INDEX: 27.45 KG/M2 | SYSTOLIC BLOOD PRESSURE: 112 MMHG | DIASTOLIC BLOOD PRESSURE: 60 MMHG | WEIGHT: 170.8 LBS

## 2024-09-09 DIAGNOSIS — N92.0 MENORRHAGIA WITH REGULAR CYCLE: Primary | ICD-10-CM

## 2024-09-09 PROCEDURE — 99213 OFFICE O/P EST LOW 20 MIN: CPT | Performed by: OBSTETRICS & GYNECOLOGY

## 2024-09-09 RX ORDER — TRANEXAMIC ACID 650 MG/1
1300 TABLET ORAL 2 TIMES DAILY
Qty: 30 TABLET | Refills: 2 | Status: SHIPPED | OUTPATIENT
Start: 2024-09-09 | End: 2024-09-14

## 2024-09-09 NOTE — PROGRESS NOTES
Assessment/Plan:     There are no diagnoses linked to this encounter.      44-year-old female  Fibroid uterus  Right hydrosalpinx  Menorrhagia  Desire to conceive  Plan  Repeat ultrasound in 6 months follow-up on fibroid uterus/hydrosalpinx  Trial of TXA risk-benefit side effect reviewed discussed with patient  Return to office in 2 to 3 months follow-up as well as I would recommend to proceed with EMB  Plan explained and discussed with patient all patient questions answered and patient was satisfied    Subjective:      Patient ID: Marcelo Duran is a 44 y.o. female.    HPI  43 yo female    1 , 1 C/S  PMH none  PSH hand surgery and C/S  [employer] NKDA    (10x regx7)  U/s done sec follow up on ovarian cyst and fibroid uterus heavy bleeding at the time of period and using condom for contraception for contraception    Ultrasound result reviewed and discussed with patient  As per patient she has heavy bleeding at the time of her menses    UTERUS:  The uterus is anteverted in position, measuring 8.7 x 6.4 x 6.7 cm.  The uterus has a normal contour and heterogeneous echotexture.  Multiple uterine myomata are demonstrated.  1.9 x 1.5 x 1.1 cm fundal intramural myoma similar to the prior exam.  3 x 3 x 2.6 cm calcified fundal intramural myoma, the measurement is an overestimate compared to the prior exam..  2.6 x 2.4 x 2.2 cm., Calcified left uterine body intramural myoma, relatively stable  Nabothian cyst(s) present, cervix otherwise within normal limits.     ENDOMETRIUM:  The endometrial echo complex has an AP caliber of 14.0 mm.  Appearance within normal limits.     OVARIES/ADNEXA:  Right ovary: 4.0 x 2.7 x 2.4 cm. 13.6 mL.  Ovarian Doppler flow is within normal limits.  Cystic structure is seen at the level of the right adnexa possibly reflecting hydrosalpinx.     Left ovary: 3.6 x 1.9 x 1.8 cm. 6.5 mL.  Ovarian Doppler flow is within normal limits.  No suspicious ovarian or adnexal abnormality.     OTHER:  There  "is moderate amount of fluid in the left adnexa.     IMPRESSION:        1. Myomatous uterus.  2. Previously described possible peritoneal inclusion cyst not visualized.  3. Cystic structure in the right adnexa which could represent hydrosalpinx.  Recommend follow-up evaluation with pelvic ultrasound in 3 to 6 months interval. Alternatively MRI pelvis would help characterize the adnexal findings.  The study was marked in EPIC for significant notification.      Based on the ultrasound finding patient denies any pelvic pain/pressure/right lower quadrant pain  As patient's only complaint heavy bleeding at the time of the menses recommendation repeat ultrasound in 3 to 6 months for follow-up as well as  management option for menorrhagia reviewed and discussed with patient tranexamic acid, hormonal method, progesterone only method, surgical management reviewed and discussed with patient patient desired to try tranexamic acid risk-benefit side effect reviewed discussed with patient  Patient also to return to office in 3 months for follow-up as well as I would recommend endometrial biopsy plan explained and discussed with patient all patient questions answered and patient was satisfied      The following portions of the patient's history were reviewed and updated as appropriate: allergies, current medications, past family history, past medical history, past social history, past surgical history and problem list.    Review of Systems      Objective:      /60 (BP Location: Left arm, Patient Position: Sitting, Cuff Size: Adult)   Ht 5' 6\" (1.676 m)   Wt 77.5 kg (170 lb 12.8 oz)   LMP 09/01/2024   BMI 27.57 kg/m²          Physical Exam    "

## 2024-09-12 ENCOUNTER — HOSPITAL ENCOUNTER (OUTPATIENT)
Dept: MAMMOGRAPHY | Facility: HOSPITAL | Age: 44
Discharge: HOME | End: 2024-09-12
Payer: COMMERCIAL

## 2024-09-12 VITALS — WEIGHT: 170.86 LBS | HEIGHT: 66 IN | BODY MASS INDEX: 27.46 KG/M2

## 2024-09-12 DIAGNOSIS — Z12.31 ENCOUNTER FOR SCREENING MAMMOGRAM FOR BREAST CANCER: ICD-10-CM

## 2024-09-12 PROCEDURE — 77067 SCR MAMMO BI INCL CAD: CPT

## 2024-09-12 PROCEDURE — 77063 BREAST TOMOSYNTHESIS BI: CPT

## 2024-09-26 ENCOUNTER — APPOINTMENT (EMERGENCY)
Dept: CT IMAGING | Facility: HOSPITAL | Age: 44
End: 2024-09-26
Payer: COMMERCIAL

## 2024-09-26 ENCOUNTER — HOSPITAL ENCOUNTER (EMERGENCY)
Facility: HOSPITAL | Age: 44
Discharge: HOME | End: 2024-09-26
Attending: EMERGENCY MEDICINE
Payer: COMMERCIAL

## 2024-09-26 VITALS
HEART RATE: 61 BPM | OXYGEN SATURATION: 98 % | DIASTOLIC BLOOD PRESSURE: 71 MMHG | TEMPERATURE: 98 F | RESPIRATION RATE: 18 BRPM | SYSTOLIC BLOOD PRESSURE: 114 MMHG

## 2024-09-26 DIAGNOSIS — M54.50 ACUTE LEFT-SIDED LOW BACK PAIN: Primary | ICD-10-CM

## 2024-09-26 DIAGNOSIS — R82.81 PYURIA: ICD-10-CM

## 2024-09-26 LAB
ANION GAP SERPL CALCULATED.3IONS-SCNC: 6 MMOL/L (ref 4–13)
BACTERIA UR QL AUTO: ABNORMAL /HPF
BASOPHILS # BLD AUTO: 0.06 THOUSANDS/ΜL (ref 0–0.1)
BASOPHILS NFR BLD AUTO: 1 % (ref 0–1)
BILIRUB UR QL STRIP: NEGATIVE
BUN SERPL-MCNC: 14 MG/DL (ref 5–25)
CALCIUM SERPL-MCNC: 9.3 MG/DL (ref 8.4–10.2)
CHLORIDE SERPL-SCNC: 104 MMOL/L (ref 96–108)
CLARITY UR: CLEAR
CO2 SERPL-SCNC: 25 MMOL/L (ref 21–32)
COLOR UR: ABNORMAL
CREAT SERPL-MCNC: 0.67 MG/DL (ref 0.6–1.3)
EOSINOPHIL # BLD AUTO: 0.09 THOUSAND/ΜL (ref 0–0.61)
EOSINOPHIL NFR BLD AUTO: 1 % (ref 0–6)
ERYTHROCYTE [DISTWIDTH] IN BLOOD BY AUTOMATED COUNT: 13.6 % (ref 11.6–15.1)
EXT PREGNANCY TEST URINE: NEGATIVE
EXT. CONTROL: NORMAL
GFR SERPL CREATININE-BSD FRML MDRD: 108 ML/MIN/1.73SQ M
GLUCOSE SERPL-MCNC: 100 MG/DL (ref 65–140)
GLUCOSE UR STRIP-MCNC: NEGATIVE MG/DL
HCT VFR BLD AUTO: 39.3 % (ref 34.8–46.1)
HGB BLD-MCNC: 12.6 G/DL (ref 11.5–15.4)
HGB UR QL STRIP.AUTO: ABNORMAL
IMM GRANULOCYTES # BLD AUTO: 0.02 THOUSAND/UL (ref 0–0.2)
IMM GRANULOCYTES NFR BLD AUTO: 0 % (ref 0–2)
KETONES UR STRIP-MCNC: NEGATIVE MG/DL
LEUKOCYTE ESTERASE UR QL STRIP: ABNORMAL
LYMPHOCYTES # BLD AUTO: 2.72 THOUSANDS/ΜL (ref 0.6–4.47)
LYMPHOCYTES NFR BLD AUTO: 37 % (ref 14–44)
MCH RBC QN AUTO: 27.5 PG (ref 26.8–34.3)
MCHC RBC AUTO-ENTMCNC: 32.1 G/DL (ref 31.4–37.4)
MCV RBC AUTO: 86 FL (ref 82–98)
MONOCYTES # BLD AUTO: 0.66 THOUSAND/ΜL (ref 0.17–1.22)
MONOCYTES NFR BLD AUTO: 9 % (ref 4–12)
MUCOUS THREADS UR QL AUTO: ABNORMAL
NEUTROPHILS # BLD AUTO: 3.77 THOUSANDS/ΜL (ref 1.85–7.62)
NEUTS SEG NFR BLD AUTO: 52 % (ref 43–75)
NITRITE UR QL STRIP: NEGATIVE
NON-SQ EPI CELLS URNS QL MICRO: ABNORMAL /HPF
NRBC BLD AUTO-RTO: 0 /100 WBCS
PH UR STRIP.AUTO: 5 [PH]
PLATELET # BLD AUTO: 318 THOUSANDS/UL (ref 149–390)
PMV BLD AUTO: 9.5 FL (ref 8.9–12.7)
POTASSIUM SERPL-SCNC: 3.8 MMOL/L (ref 3.5–5.3)
PROT UR STRIP-MCNC: NEGATIVE MG/DL
RBC # BLD AUTO: 4.58 MILLION/UL (ref 3.81–5.12)
RBC #/AREA URNS AUTO: ABNORMAL /HPF
SODIUM SERPL-SCNC: 135 MMOL/L (ref 135–147)
SP GR UR STRIP.AUTO: 1.02 (ref 1–1.03)
UROBILINOGEN UR STRIP-ACNC: <2 MG/DL
WBC # BLD AUTO: 7.32 THOUSAND/UL (ref 4.31–10.16)
WBC #/AREA URNS AUTO: ABNORMAL /HPF

## 2024-09-26 PROCEDURE — 85025 COMPLETE CBC W/AUTO DIFF WBC: CPT | Performed by: EMERGENCY MEDICINE

## 2024-09-26 PROCEDURE — 99285 EMERGENCY DEPT VISIT HI MDM: CPT | Performed by: EMERGENCY MEDICINE

## 2024-09-26 PROCEDURE — 81001 URINALYSIS AUTO W/SCOPE: CPT | Performed by: EMERGENCY MEDICINE

## 2024-09-26 PROCEDURE — 99284 EMERGENCY DEPT VISIT MOD MDM: CPT

## 2024-09-26 PROCEDURE — 80048 BASIC METABOLIC PNL TOTAL CA: CPT | Performed by: EMERGENCY MEDICINE

## 2024-09-26 PROCEDURE — 96374 THER/PROPH/DIAG INJ IV PUSH: CPT

## 2024-09-26 PROCEDURE — 74176 CT ABD & PELVIS W/O CONTRAST: CPT

## 2024-09-26 PROCEDURE — 96375 TX/PRO/DX INJ NEW DRUG ADDON: CPT

## 2024-09-26 PROCEDURE — 81025 URINE PREGNANCY TEST: CPT | Performed by: EMERGENCY MEDICINE

## 2024-09-26 PROCEDURE — 36415 COLL VENOUS BLD VENIPUNCTURE: CPT | Performed by: EMERGENCY MEDICINE

## 2024-09-26 RX ORDER — HYDROMORPHONE HCL/PF 1 MG/ML
0.5 SYRINGE (ML) INJECTION ONCE
Status: COMPLETED | OUTPATIENT
Start: 2024-09-26 | End: 2024-09-26

## 2024-09-26 RX ORDER — CEPHALEXIN 500 MG/1
500 CAPSULE ORAL 2 TIMES DAILY
Qty: 14 CAPSULE | Refills: 0 | Status: SHIPPED | OUTPATIENT
Start: 2024-09-26 | End: 2024-10-03

## 2024-09-26 RX ORDER — KETOROLAC TROMETHAMINE 30 MG/ML
15 INJECTION, SOLUTION INTRAMUSCULAR; INTRAVENOUS ONCE
Status: COMPLETED | OUTPATIENT
Start: 2024-09-26 | End: 2024-09-26

## 2024-09-26 RX ORDER — CYCLOBENZAPRINE HCL 10 MG
10 TABLET ORAL 3 TIMES DAILY PRN
Qty: 15 TABLET | Refills: 0 | Status: SHIPPED | OUTPATIENT
Start: 2024-09-26

## 2024-09-26 RX ORDER — IBUPROFEN 600 MG/1
600 TABLET, FILM COATED ORAL EVERY 8 HOURS PRN
Qty: 15 TABLET | Refills: 0 | Status: SHIPPED | OUTPATIENT
Start: 2024-09-26

## 2024-09-26 RX ADMIN — CEPHALEXIN 500 MG: 250 CAPSULE ORAL at 07:47

## 2024-09-26 RX ADMIN — KETOROLAC TROMETHAMINE 15 MG: 30 INJECTION, SOLUTION INTRAMUSCULAR; INTRAVENOUS at 06:32

## 2024-09-26 RX ADMIN — HYDROMORPHONE HYDROCHLORIDE 0.5 MG: 1 INJECTION, SOLUTION INTRAMUSCULAR; INTRAVENOUS; SUBCUTANEOUS at 06:58

## 2024-09-26 NOTE — Clinical Note
Marcelo Duran was seen and treated in our emergency department on 9/26/2024.                Diagnosis:     Marcelo  .    She may return on this date:     May return to work 9/28-30, as improved     If you have any questions or concerns, please don't hesitate to call.      Naima Hills MD    ______________________________           _______________          _______________  Hospital Representative                              Date                                Time

## 2024-09-26 NOTE — ED PROVIDER NOTES
"1. Acute left-sided low back pain    2. Pyuria      ED Disposition       ED Disposition   Discharge    Condition   Stable    Date/Time   u Sep 26, 2024  7:45 AM    Comment   Marcelo Duran discharge to home/self care.                   Assessment & Plan       Medical Decision Making  Amount and/or Complexity of Data Reviewed  External Data Reviewed: labs and radiology.  Labs: ordered. Decision-making details documented in ED Course.  Radiology: ordered. Decision-making details documented in ED Course.    Risk  OTC drugs.  Prescription drug management.                ED Course as of 09/26/24 0843   Thu Sep 26, 2024   0626 Differential diagnosis includes but is not limited to ureterolithiasis, pyelonephritis, lumbar radiculopathy with muscle spasm.  Doubt PE.   0652 Patient relates that her pain is \"still there.\"  She would be able to get a ride home.  Will administer opioid.   0654 She did just return from CT scan.  Calcified fibroids appreciated.  I do not appreciate hydronephrosis.  Radiology interpretation pending.    UA reveals mild pyuria.  No bacteria visualized.   0722 No acute abnormality appreciated on CT abdomen/pelvis.  Specific there are no calcified ureteral or kidney stones.   0746 Patient feeling much improved at this time.  I updated her on study results.  Pyuria appreciated.  In the setting of urinary frequency and mild dysuria do have concern for pyelonephritis.  Will treat with course of antibiotics-first dose here.    Without bacteria visualized in urine specimen and only trace leukocyte esterase with small number of white blood cells I am uncertain if all of her discomfort is coming from urinary origin.  I do still have some concern for radiculopathy and muscle spasm.  Will order medications that would help with this.  Discussed comprehensive spine program.  Will place referral.  Patient does not currently have a PCP.       Medications   ketorolac (TORADOL) injection 15 mg (15 mg Intravenous " "Given 9/26/24 0632)   HYDROmorphone (DILAUDID) injection 0.5 mg (0.5 mg Intravenous Given 9/26/24 0658)   cephalexin (KEFLEX) capsule 500 mg (500 mg Oral Given 9/26/24 0747)       History of Present Illness       Patient is a 44-year-old female healthy at baseline who presents to the emergency department for evaluation with left flank pain.  She noticed this upon waking yesterday morning and thought that it might have been related to positioning overnight.  Discomfort has continued and worsened.  There is a pleuritic component and she additionally notes walking exacerbates the discomfort.  Pain is described as \"sharp and stabbing.\"  It does not radiate anywhere.  She has also noticed urinary frequency and mild dysuria.  She relates that the urine has a slightly different coloring than typical-later.  She suggests that it may be cloudy.  She has not appreciated any blood in this.  No nausea, vomiting or fevers.  No cough.  No leg swelling or cramping.  No history of similar discomfort.  She has not taken anything for pain.  No history of abdominal problems or abdominal surgeries.  She denies possibility of pregnancy.        Review of Systems   All other systems reviewed and are negative.          Objective     ED Triage Vitals [09/26/24 0619]   Temperature Pulse Blood Pressure Respirations SpO2 Patient Position - Orthostatic VS   98 °F (36.7 °C) 76 (!) 172/86 22 99 % Sitting      Temp Source Heart Rate Source BP Location FiO2 (%) Pain Score    Oral Monitor Right arm -- 8        Physical Exam  Vitals and nursing note reviewed.   Constitutional:       General: She is in acute distress.      Comments: Uncomfortable appearing especially while walking to the room.   HENT:      Mouth/Throat:      Mouth: Mucous membranes are moist.   Eyes:      General: No scleral icterus.  Cardiovascular:      Rate and Rhythm: Normal rate and regular rhythm.   Pulmonary:      Effort: Pulmonary effort is normal.      Breath sounds: Normal " breath sounds.   Abdominal:      General: Bowel sounds are normal.      Palpations: Abdomen is soft.   Musculoskeletal:         General: Normal range of motion.      Comments: No midline or right lumbar tenderness.  Exquisite tenderness in the left CVA region.  No overlying skin change.   Skin:     General: Skin is warm and dry.   Neurological:      Mental Status: She is alert.   Psychiatric:         Mood and Affect: Mood normal.         Behavior: Behavior normal.         Labs Reviewed   UA W REFLEX TO MICROSCOPIC WITH REFLEX TO CULTURE - Abnormal       Result Value    Color, UA Light Yellow      Clarity, UA Clear      Specific Gravity, UA 1.020      pH, UA 5.0      Leukocytes, UA Trace (*)     Nitrite, UA Negative      Protein, UA Negative      Glucose, UA Negative      Ketones, UA Negative      Urobilinogen, UA <2.0      Bilirubin, UA Negative      Occult Blood, UA Trace (*)    URINE MICROSCOPIC - Abnormal    RBC, UA None Seen      WBC, UA 4-10 (*)     Epithelial Cells Occasional      Bacteria, UA None Seen      MUCUS THREADS Occasional (*)    POCT PREGNANCY, URINE - Normal    EXT Preg Test, Ur Negative      Control Valid     CBC AND DIFFERENTIAL    WBC 7.32      RBC 4.58      Hemoglobin 12.6      Hematocrit 39.3      MCV 86      MCH 27.5      MCHC 32.1      RDW 13.6      MPV 9.5      Platelets 318      nRBC 0      Segmented % 52      Immature Grans % 0      Lymphocytes % 37      Monocytes % 9      Eosinophils Relative 1      Basophils Relative 1      Absolute Neutrophils 3.77      Absolute Immature Grans 0.02      Absolute Lymphocytes 2.72      Absolute Monocytes 0.66      Eosinophils Absolute 0.09      Basophils Absolute 0.06     BASIC METABOLIC PANEL    Sodium 135      Potassium 3.8      Chloride 104      CO2 25      ANION GAP 6      BUN 14      Creatinine 0.67      Glucose 100      Calcium 9.3      eGFR 108      Narrative:     [employer] guidelines for Chronic Kidney Disease (CKD):      Stage 1 with normal or high GFR (GFR > 90 mL/min/1.73 square meters)    Stage 2 Mild CKD (GFR = 60-89 mL/min/1.73 square meters)    Stage 3A Moderate CKD (GFR = 45-59 mL/min/1.73 square meters)    Stage 3B Moderate CKD (GFR = 30-44 mL/min/1.73 square meters)    Stage 4 Severe CKD (GFR = 15-29 mL/min/1.73 square meters)    Stage 5 End Stage CKD (GFR <15 mL/min/1.73 square meters)  Note: GFR calculation is accurate only with a steady state creatinine     CT renal stone study abdomen pelvis without contrast   Final Interpretation by Kade Bernabe MD (09/26 0704)      No acute pathology. Specifically, no urinary tract calculi or obstructive uropathy.         Workstation performed: [id number]             Procedures    ED Medication and Procedure Management   Prior to Admission Medications   Prescriptions Last Dose Informant Patient Reported? Taking?   Diclofenac Sodium (VOLTAREN) 1 %   No No   Sig: Apply 2 g topically 4 (four) times a day   lidocaine (LMX) 4 % cream   No No   Sig: Apply topically as needed for mild pain   methocarbamol (ROBAXIN) 500 mg tablet   No No   Sig: Take 1 tablet (500 mg total) by mouth 2 (two) times a day as needed for muscle spasms   naproxen (NAPROSYN) 500 mg tablet  Self Yes No   Sig: Take 500 mg by mouth 2 (two) times a day as needed      Facility-Administered Medications: None     Discharge Medication List as of 9/26/2024  7:53 AM        START taking these medications    Details   cephalexin (KEFLEX) 500 mg capsule Take 1 capsule (500 mg total) by mouth 2 (two) times a day for 7 days, Starting Thu 9/26/2024, Until Thu 10/3/2024, Normal      cyclobenzaprine (FLEXERIL) 10 mg tablet Take 1 tablet (10 mg total) by mouth 3 (three) times a day as needed for muscle spasms, Starting Thu 9/26/2024, Normal      ibuprofen (MOTRIN) 600 mg tablet Take 1 tablet (600 mg total) by mouth every 8 (eight) hours as needed for moderate pain, Starting Thu 9/26/2024, Normal           CONTINUE these  medications which have NOT CHANGED    Details   Diclofenac Sodium (VOLTAREN) 1 % Apply 2 g topically 4 (four) times a day, Starting Tue 5/16/2023, Normal      lidocaine (LMX) 4 % cream Apply topically as needed for mild pain, Starting Tue 7/18/2023, Normal      methocarbamol (ROBAXIN) 500 mg tablet Take 1 tablet (500 mg total) by mouth 2 (two) times a day as needed for muscle spasms, Starting Fri 3/31/2023, Normal      naproxen (NAPROSYN) 500 mg tablet Take 500 mg by mouth 2 (two) times a day as needed, Starting Fri 2/25/2022, Historical Med                Naima Hills MD  09/26/24 0893

## 2024-09-26 NOTE — DISCHARGE INSTRUCTIONS
As discussed urine results are abnormal with presence of white blood cells suggesting possibility of urinary tract infection.  Please take cephalexin antibiotic orally 2 times daily until gone or directed otherwise.  (Urine culture has been sent to the lab.  You will be notified if a change in treatment is necessary.).    You may take ibuprofen 600 mg orally every 8 hours as needed for pain and cyclobenzaprine up to 3 times daily as needed for muscle spasm.    A referral has been placed to the comprehensive spine program.  You can anticipate receiving a phone call in the next couple of business days.  Close follow-up can be arranged for further evaluation/treatment.

## 2024-09-27 ENCOUNTER — TELEPHONE (OUTPATIENT)
Dept: PHYSICAL THERAPY | Facility: OTHER | Age: 44
End: 2024-09-27

## 2024-09-27 NOTE — TELEPHONE ENCOUNTER
Call placed to the patient per [facility] referral.    V/m left for patient to call back. Phone number and hours of business provided.    This is the 1st attempt to reach the patient. Will defer referral protocol.

## 2024-10-02 NOTE — TELEPHONE ENCOUNTER
Call placed to the patient per [facility] referral.    Voice message left for patient to call back. Phone number and hours of business provided.     Per comp spine protocol will close referral and assist if a call back is received.

## 2024-12-09 ENCOUNTER — OFFICE VISIT (OUTPATIENT)
Dept: OBGYN CLINIC | Facility: CLINIC | Age: 44
End: 2024-12-09
Payer: COMMERCIAL

## 2024-12-09 VITALS
WEIGHT: 176 LBS | BODY MASS INDEX: 28.28 KG/M2 | HEIGHT: 66 IN | DIASTOLIC BLOOD PRESSURE: 70 MMHG | SYSTOLIC BLOOD PRESSURE: 116 MMHG

## 2024-12-09 DIAGNOSIS — N92.0 MENORRHAGIA WITH REGULAR CYCLE: ICD-10-CM

## 2024-12-09 DIAGNOSIS — R82.90 ABNORMAL URINE ODOR: Primary | ICD-10-CM

## 2024-12-09 PROCEDURE — 87086 URINE CULTURE/COLONY COUNT: CPT | Performed by: OBSTETRICS & GYNECOLOGY

## 2024-12-09 PROCEDURE — 87186 SC STD MICRODIL/AGAR DIL: CPT | Performed by: OBSTETRICS & GYNECOLOGY

## 2024-12-09 PROCEDURE — 58100 BIOPSY OF UTERUS LINING: CPT | Performed by: OBSTETRICS & GYNECOLOGY

## 2024-12-09 PROCEDURE — 88305 TISSUE EXAM BY PATHOLOGIST: CPT | Performed by: PATHOLOGY

## 2024-12-09 RX ORDER — TRANEXAMIC ACID 650 MG/1
1300 TABLET ORAL 2 TIMES DAILY
COMMUNITY
Start: 2024-09-26

## 2024-12-09 RX ORDER — NITROFURANTOIN 25; 75 MG/1; MG/1
100 CAPSULE ORAL 2 TIMES DAILY
Qty: 14 CAPSULE | Refills: 0 | Status: SHIPPED | OUTPATIENT
Start: 2024-12-09 | End: 2024-12-16

## 2024-12-09 NOTE — PROGRESS NOTES
"Assessment/Plan:     Diagnoses and all orders for this visit:    Abnormal urine odor  -     nitrofurantoin (MACROBID) 100 mg capsule; Take 1 capsule (100 mg total) by mouth 2 (two) times a day for 7 days    Menorrhagia with regular cycle    Other orders  -     Tranexamic Acid 650 MG TABS; Take 1,300 mg by mouth 2 (two) times a day For 5 days      44-year-old female  Fibroid uterus  Right hydrosalpinx  Menorrhagia respond to TXA  Desire to conceive  Plan  Repeat ultrasound follow-up on fibroid uterus and hydrosalpinx  Follow-up with [facility]  Endometrial biopsy done today  Continue TXA when needed  Return to office for annual exam    Subjective:      Patient ID: Marcelo Duran is a 44 y.o. female.    HPI  Patient seen evaluated presents office today for biopsy second  Patient also here today to follow-up on menorrhagia  Patient said her bleeding significant for tranexamic acid  Patient desired to continue TXA when needed for her menorrhagia  Procedure explained discussed with patient all patient questions answered and patient was satisfied    Patient also complaining of urine odor Macrobid called to the pharmacy as well as urine culture sent we will call patient with final result if different antibiotics needed    Patient also desired fertility refer to [facility] recommended secondary to the patient age and trial for 3 months without success  The following portions of the patient's history were reviewed and updated as appropriate: allergies, current medications, past family history, past medical history, past social history, past surgical history and problem list.    Review of Systems      Objective:      /70 (BP Location: Left arm, Patient Position: Sitting, Cuff Size: Adult)   Ht 5' 6\" (1.676 m)   Wt 79.8 kg (176 lb)   BMI 28.41 kg/m²          Physical Exam      Endometrial biopsy    Date/Time: 12/9/2024 2:15 PM    Performed by: Kimberlee Hall MD  Authorized by: Kimberlee Hall MD  Universal Protocol:  Consent: " "Verbal consent obtained. Written consent obtained.  Risks and benefits: risks, benefits and alternatives were discussed  Consent given by: patient  Time out: Immediately prior to procedure a \"time out\" was called to verify the correct patient, procedure, equipment, support staff and site/side marked as required.  Patient understanding: patient states understanding of the procedure being performed  Patient consent: the patient's understanding of the procedure matches consent given  Procedure consent: procedure consent matches procedure scheduled  Relevant documents: relevant documents present and verified  Patient identity confirmed: verbally with patient    Indication:     Indications: Other disorder of menstruation and other abnormal bleeding from female genital tract    Procedure:     Procedure: endometrial biopsy with Pipelle      A bivalve speculum was placed in the vagina: yes      Cervix cleaned and prepped: yes      The cervix was dilated: yes      Uterus sounded: no      Specimen collected: specimen collected and sent to pathology      Patient tolerated procedure well with no complications: yes    Findings:     Uterus size:  Non-gravid    Cervix: normal      Adnexa: normal        "

## 2024-12-11 ENCOUNTER — RESULTS FOLLOW-UP (OUTPATIENT)
Dept: OBGYN CLINIC | Facility: CLINIC | Age: 44
End: 2024-12-11

## 2024-12-12 LAB — BACTERIA UR CULT: ABNORMAL

## 2024-12-12 PROCEDURE — 88305 TISSUE EXAM BY PATHOLOGIST: CPT | Performed by: PATHOLOGY

## 2025-03-20 ENCOUNTER — HOSPITAL ENCOUNTER (EMERGENCY)
Facility: HOSPITAL | Age: 45
Discharge: HOME | End: 2025-03-20
Attending: EMERGENCY MEDICINE
Payer: COMMERCIAL

## 2025-03-20 VITALS
RESPIRATION RATE: 18 BRPM | TEMPERATURE: 98.2 F | DIASTOLIC BLOOD PRESSURE: 74 MMHG | SYSTOLIC BLOOD PRESSURE: 132 MMHG | HEART RATE: 73 BPM | OXYGEN SATURATION: 99 %

## 2025-03-20 DIAGNOSIS — M54.42 ACUTE LEFT-SIDED LOW BACK PAIN WITH LEFT-SIDED SCIATICA: Primary | ICD-10-CM

## 2025-03-20 LAB
BILIRUB UR QL STRIP: NEGATIVE
CLARITY UR: CLEAR
COLOR UR: COLORLESS
GLUCOSE UR STRIP-MCNC: NEGATIVE MG/DL
HGB UR QL STRIP.AUTO: NEGATIVE
KETONES UR STRIP-MCNC: NEGATIVE MG/DL
LEUKOCYTE ESTERASE UR QL STRIP: NEGATIVE
NITRITE UR QL STRIP: NEGATIVE
PH UR STRIP.AUTO: 7.5 [PH]
PROT UR STRIP-MCNC: NEGATIVE MG/DL
SP GR UR STRIP.AUTO: 1.01 (ref 1–1.03)
UROBILINOGEN UR STRIP-ACNC: <2 MG/DL

## 2025-03-20 PROCEDURE — 81003 URINALYSIS AUTO W/O SCOPE: CPT

## 2025-03-20 PROCEDURE — 99284 EMERGENCY DEPT VISIT MOD MDM: CPT | Performed by: EMERGENCY MEDICINE

## 2025-03-20 PROCEDURE — 99283 EMERGENCY DEPT VISIT LOW MDM: CPT

## 2025-03-20 RX ORDER — NAPROXEN 250 MG/1
500 TABLET ORAL ONCE
Status: COMPLETED | OUTPATIENT
Start: 2025-03-20 | End: 2025-03-20

## 2025-03-20 RX ORDER — CYCLOBENZAPRINE HCL 10 MG
10 TABLET ORAL 3 TIMES DAILY PRN
Qty: 20 TABLET | Refills: 0 | Status: SHIPPED | OUTPATIENT
Start: 2025-03-20

## 2025-03-20 RX ORDER — NAPROXEN 500 MG/1
500 TABLET ORAL 2 TIMES DAILY PRN
Qty: 20 TABLET | Refills: 0 | Status: SHIPPED | OUTPATIENT
Start: 2025-03-20

## 2025-03-20 RX ADMIN — NAPROXEN 500 MG: 250 TABLET ORAL at 12:45

## 2025-03-20 NOTE — ED PROVIDER NOTES
Time reflects when diagnosis was documented in both MDM as applicable and the Disposition within this note       Time User Action Codes Description Comment    3/20/2025 12:38 PM Ricky Bradley Add [M54.42] Acute left-sided low back pain with left-sided sciatica           ED Disposition       ED Disposition   Discharge    Condition   Stable    Date/Time   Thu Mar 20, 2025 12:38 PM    Comment   Marcelo Duran discharge to home/self care.                   Assessment & Plan       Medical Decision Making        Initial ED assessment:    44-year-old female presents with left lower back pain radiates down her left leg.,  Normal reflexes on exam but has a significant muscle spasm.  No CVA tenderness.    Pathology at risk for includes but is not limited to:   Musculoskeletal back pain with sciatica, no historical or physical exam evidence of nephrolithiasis or pyelonephritis.    Initial ED plan:   Check urine for UTI and/or signs of pyelonephritis so I feel this is less likely.  Denies history of severe or worsening lower extremity weakness/numbness. Denies history of saddle anesthesia/perineal anesthesia. Denies bowel or bladder incontinence/retention.  History does not suggest diagnosis of cauda equina syndrome.  Patient denies history of IVDA, denies history of fevers, no recent surgeries or any procedures to suggest a transient bacteremia leading to a diagnosis of epidural abscess. Denies history of blood thinner use with recent history of lumbar puncture or any violation of the epidural space to suggest history of epidural hematoma. Therefore these above diagnoses (cauda equina syndrome, epidural abscess, epidural hematoma) were not pursued with diagnostic imaging.             Final ED summary/disposition:   After evaluation and workup in the emergency department, ultimately patient discharged on naproxen and Flexeril, given naproxen here, given a work note, patient discharged      Risk  Prescription drug  management.             Medications   naproxen (NAPROSYN) tablet 500 mg (has no administration in time range)       ED Risk Strat Scores                                                History of Present Illness       Chief Complaint   Patient presents with    Back Pain     Pt reports lower left back pain onset Tuesday, pain radiating down to leg that made patient unable to ambulate, reports muscle spasms when ambulating, also reports headaches that radiate down neck, denies injury/trauma       Past Medical History:   Diagnosis Date    Fibroid       Past Surgical History:   Procedure Laterality Date     SECTION      HAND SURGERY        Family History   Problem Relation Age of Onset    No Known Problems Mother     No Known Problems Father     Fibroids Sister     No Known Problems Sister     No Known Problems Daughter     Breast cancer Maternal Grandmother 60    No Known Problems Maternal Grandfather     No Known Problems Paternal Grandmother     No Known Problems Paternal Grandfather     No Known Problems Son     No Known Problems Maternal Aunt     No Known Problems Maternal Aunt     No Known Problems Paternal Aunt       Social History     Tobacco Use    Smoking status: Never    Smokeless tobacco: Never   Vaping Use    Vaping status: Never Used   Substance Use Topics    Alcohol use: Yes     Comment: socailly     Drug use: Never      E-Cigarette/Vaping    E-Cigarette Use Never User       E-Cigarette/Vaping Substances    Nicotine No     THC No     CBD No     Flavoring No     Other No     Unknown No       I have reviewed and agree with the history as documented.     44-year-old female presents with left lower back pain.,  Dull, achy, worse with movement, started about 4 days ago, woke up, sat actively getting out of bed, took a step on the ground went to stand up and felt a sharp pain in her left lower back which went down her left buttocks to behind her left knee.  Says intermittently she was having a  pins-and-needles type sensation in both of her feet, throughout that day but has resolved since.  No bowel bladder incontinence.  No saddle anesthesia.  History of lower back pain but never were goes down the leg before.  History of kidney infections but admits this does feel different.  No urinary symptoms, no dysuria no urgency.  No fevers.      Back Pain  Associated symptoms: no abdominal pain, no chest pain, no dysuria, no fever, no headaches and no numbness        Review of Systems   Constitutional:  Negative for activity change, chills, diaphoresis and fever.   HENT:  Negative for congestion, sinus pressure and sore throat.    Eyes:  Negative for pain and visual disturbance.   Respiratory:  Negative for cough, chest tightness, shortness of breath, wheezing and stridor.    Cardiovascular:  Negative for chest pain and palpitations.   Gastrointestinal:  Negative for abdominal distention, abdominal pain, constipation, diarrhea, nausea and vomiting.   Genitourinary:  Negative for dysuria and frequency.   Musculoskeletal:  Positive for back pain. Negative for neck pain and neck stiffness.   Skin:  Negative for rash.   Neurological:  Negative for dizziness, speech difficulty, light-headedness, numbness and headaches.           Objective       ED Triage Vitals [03/20/25 1103]   Temperature Pulse Blood Pressure Respirations SpO2 Patient Position - Orthostatic VS   98.2 °F (36.8 °C) 73 132/74 18 99 % Sitting      Temp Source Heart Rate Source BP Location FiO2 (%) Pain Score    Oral Monitor Left arm -- --      Vitals      Date and Time Temp Pulse SpO2 Resp BP Pain Score FACES Pain Rating User   03/20/25 1103 98.2 °F (36.8 °C) 73 99 % 18 132/74 -- -- TA            Physical Exam  Vitals reviewed.   Constitutional:       General: She is not in acute distress.     Appearance: She is well-developed. She is not diaphoretic.   HENT:      Head: Normocephalic and atraumatic.      Right Ear: External ear normal.      Left Ear:  External ear normal.      Nose: Nose normal.   Eyes:      General:         Right eye: No discharge.         Left eye: No discharge.      Pupils: Pupils are equal, round, and reactive to light.   Neck:      Trachea: No tracheal deviation.   Cardiovascular:      Rate and Rhythm: Normal rate and regular rhythm.      Heart sounds: Normal heart sounds. No murmur heard.  Pulmonary:      Effort: Pulmonary effort is normal. No respiratory distress.      Breath sounds: Normal breath sounds. No stridor.   Abdominal:      General: There is no distension.      Palpations: Abdomen is soft.      Tenderness: There is no abdominal tenderness. There is no right CVA tenderness, left CVA tenderness, guarding or rebound.   Musculoskeletal:         General: Normal range of motion.      Cervical back: Normal range of motion and neck supple.      Comments: No spinous process tenderness, left-sided hypertonicity paraspinal musculature consistent with acute muscle spasm., +2 patella and Achilles reflex bilaterally. Normal sensation normal muscle strength bilateral lower extremities     Skin:     General: Skin is warm and dry.      Coloration: Skin is not pale.      Findings: No erythema.   Neurological:      General: No focal deficit present.      Mental Status: She is alert and oriented to person, place, and time.         Results Reviewed       Procedure Component Value Units Date/Time    UA w Reflex to Microscopic w Reflex to Culture [[id number]] Collected: 03/20/25 1115    Lab Status: Final result Specimen: Urine, Clean Catch Updated: 03/20/25 1125     Color, UA Colorless     Clarity, UA Clear     Specific Gravity, UA 1.009     pH, UA 7.5     Leukocytes, UA Negative     Nitrite, UA Negative     Protein, UA Negative mg/dl      Glucose, UA Negative mg/dl      Ketones, UA Negative mg/dl      Urobilinogen, UA <2.0 mg/dl      Bilirubin, UA Negative     Occult Blood, UA Negative            No orders to display       Procedures    ED Medication  and Procedure Management   Prior to Admission Medications   Prescriptions Last Dose Informant Patient Reported? Taking?   Diclofenac Sodium (VOLTAREN) 1 %   No No   Sig: Apply 2 g topically 4 (four) times a day   Tranexamic Acid 650 MG TABS   Yes No   Sig: Take 1,300 mg by mouth 2 (two) times a day For 5 days   cyclobenzaprine (FLEXERIL) 10 mg tablet   No No   Sig: Take 1 tablet (10 mg total) by mouth 3 (three) times a day as needed for muscle spasms   ibuprofen (MOTRIN) 600 mg tablet   No No   Sig: Take 1 tablet (600 mg total) by mouth every 8 (eight) hours as needed for moderate pain   lidocaine (LMX) 4 % cream   No No   Sig: Apply topically as needed for mild pain   methocarbamol (ROBAXIN) 500 mg tablet   No No   Sig: Take 1 tablet (500 mg total) by mouth 2 (two) times a day as needed for muscle spasms   naproxen (NAPROSYN) 500 mg tablet  Self Yes No   Sig: Take 500 mg by mouth 2 (two) times a day as needed      Facility-Administered Medications: None     Patient's Medications   Discharge Prescriptions    CYCLOBENZAPRINE (FLEXERIL) 10 MG TABLET    Take 1 tablet (10 mg total) by mouth 3 (three) times a day as needed for muscle spasms       Start Date: 3/20/2025 End Date: --       Order Dose: 10 mg       Quantity: 20 tablet    Refills: 0    NAPROXEN (NAPROSYN) 500 MG TABLET    Take 1 tablet (500 mg total) by mouth 2 (two) times a day as needed for mild pain       Start Date: 3/20/2025 End Date: --       Order Dose: 500 mg       Quantity: 20 tablet    Refills: 0     No discharge procedures on file.  ED SEPSIS DOCUMENTATION   Time reflects when diagnosis was documented in both MDM as applicable and the Disposition within this note       Time User Action Codes Description Comment    3/20/2025 12:38 PM Ricky Bradley Add [M54.42] Acute left-sided low back pain with left-sided sciatica                  Ricky Bradley DO  03/20/25 1244

## 2025-03-20 NOTE — Clinical Note
Marcelo Duran was seen and treated in our emergency department on 3/20/2025.    No restrictions            Diagnosis:     Marcelo  .    She may return on this date: 03/24/2025         If you have any questions or concerns, please don't hesitate to call.      Ricky Bradley, DO    ______________________________           _______________          _______________  Hospital Representative                              Date                                Time

## 2025-03-24 ENCOUNTER — NURSE TRIAGE (OUTPATIENT)
Dept: PHYSICAL THERAPY | Facility: OTHER | Age: 45
End: 2025-03-24

## 2025-03-24 DIAGNOSIS — M54.50 ACUTE LEFT-SIDED LOW BACK PAIN, UNSPECIFIED WHETHER SCIATICA PRESENT: Primary | ICD-10-CM

## 2025-03-24 NOTE — TELEPHONE ENCOUNTER
Additional Information   Negative: Is this a chronic condition?   Negative: Has the patient had unexplained weight loss?   Negative: Does the patient have a fever?   Negative: Is the patient experiencing urine retention?   Negative: Is the patient experiencing acute drop foot or paralysis?   Negative: Has the patient experienced major trauma? (fall from height, high speed collision, direct blow to spine) and is also experiencing nausea, light-headedness, or loss of consciousness?   Negative: Is the patient experiencing blood in sputum?    Protocols used: [facility] Protocol    [facility] was reviewed in detail and what we can provide for their back pain.  Patient is agreeable to being triaged and would like to proceed with Physical Therapy.    Referral was placed for Physical Therapy at the *[facility]* site. Patients information was sent to the  to make evaluation appointment. Patient made aware that the PT office  will be calling to schedule the appointment.  Patient was provided with the phone number to the PT office.    No further questions and/or concerns were voiced by the patient at this time. Patient states understanding of the referral that was placed.

## 2025-03-24 NOTE — TELEPHONE ENCOUNTER
Additional Information  • Negative: Is this related to a work injury?  • Negative: Is this related to an MVA?  • Negative: Are you currently recieving homecare services?    Background - Initial Assessment  Clinical complaint: Pain is left low back, no more radiation. Was having pain down left leg. With pins and needles sensation. Also states she has on and off headaches with pain into her neck that feels throbbing. Neck pain started approx end of Feb. Low back pain started 3/15/25. NKI Pt was getting out of bed and went to step down. Had pain with radiation into leg. Has had prior low back pain but never into the leg. No prior back or neck SX. Had similar back pain Sept 2024, that was ruled a kidney infection. Per chart saw Dr Montano in 2021 also had MRI at that time. Seen in ED 3/20/25. Pain in back is intermittent. And feels sharp. Neck pain feels throbbing like a heartbeat.   Date of onset: 3/15/25 back  Frequency of pain: intermittent  Quality of pain: sharp(back) throbbing (neck)    Protocols used: [facility] Protocol

## 2025-03-26 ENCOUNTER — EVALUATION (OUTPATIENT)
Dept: PHYSICAL THERAPY | Facility: REHABILITATION | Age: 45
End: 2025-03-26
Payer: COMMERCIAL

## 2025-03-26 DIAGNOSIS — M54.50 ACUTE LEFT-SIDED LOW BACK PAIN, UNSPECIFIED WHETHER SCIATICA PRESENT: ICD-10-CM

## 2025-03-26 PROCEDURE — 97110 THERAPEUTIC EXERCISES: CPT | Performed by: PHYSICAL THERAPIST

## 2025-03-26 PROCEDURE — 97162 PT EVAL MOD COMPLEX 30 MIN: CPT | Performed by: PHYSICAL THERAPIST

## 2025-03-26 NOTE — PROGRESS NOTES
LVM for pt to return call, office number given     PT Evaluation     Today's date: 3/26/2025  Patient name: Marcelo Duran  : 1980  MRN: [id number]  Referring provider: Brendon Bunn PT  Dx:   Encounter Diagnosis     ICD-10-CM    1. Acute left-sided low back pain, unspecified whether sciatica present  M54.50 Ambulatory referral to PT spine                     Assessment  Impairments: abnormal coordination, abnormal gait, abnormal muscle firing, abnormal or restricted ROM, abnormal movement, activity intolerance, impaired balance, impaired physical strength, lacks appropriate home exercise program, pain with function and weight-bearing intolerance    Assessment details: Patient presents to PT with low back pain. Patient displays decreased lumbar ROM, core strength. These impairments are leading to pain with bending, lifting, sitting. Patient will benefit from skilled PT to address above impairments and help them return to PLOF.    Barriers to therapy: Physical job  Understanding of Dx/Px/POC: good     Prognosis: good    Goals  STG  1. Patient will display decreased pain to 0-2 in 6 weeks  2. Patient will display lumbar ROM WNL in 6 weeks  3. Patient will display core strength WNL in 6 weeks    LTG  1. Patient will be able to stand for 30 minutes without pain in 12 weeks  2. Patient will be able to walk for 30 minutes without pain in 12 weeks  3. Patient will be pick an object up off the floor without pain in 12 weeks          Plan  Planned modality interventions: traction, TENS, biofeedback, cryotherapy and thermotherapy: hydrocollator packs    Planned therapy interventions: abdominal trunk stabilization, activity modification, ADL training, balance, balance/weight bearing training, body mechanics training, joint mobilization, manual therapy, massage, motor coordination training, neuromuscular re-education, patient education, postural training, self care, strengthening, stretching, therapeutic activities, therapeutic exercise, transfer training, home  exercise program, graded activity, graded exercise, functional ROM exercises and flexibility    Frequency: 2x week  Duration in weeks: 6  Plan of Care beginning date: 3/26/2025  Plan of Care expiration date: 2025        Subjective Evaluation    History of Present Illness  Mechanism of injury: Patient states last Saturday when she woke up she had a lot of pain when she put her leg on the ground. She had back and L LE pain. Patient states the pain is improving. Patient with no leg pain at this point. Patient works in maintenance. Patient takes meds as needed. Patient with pain with bending, lifting, twisting          Not a recurrent problem   Quality of life: fair    Patient Goals  Patient goals for therapy: decreased edema, decreased pain, increased motion and increased strength    Pain  Current pain ratin  At best pain ratin  At worst pain ratin  Quality: sharp, tight and radiating  Relieving factors: medications and heat  Aggravating factors: lifting and sitting  Progression: improved    Social Support  Steps to enter house: yes    Employment status: working        Objective     Active Range of Motion     Lumbar   Flexion: 40 degrees  with pain Restriction level: minimal  Extension: 10 degrees  with pain Restriction level: moderate  Left lateral flexion: 15 degrees    with pain Restriction level: moderate  Right lateral flexion: 15 degrees  with pain Restriction level: moderate  Left rotation:  WFL  Right rotation:  WFL    Tests     Lumbar     Left   Negative passive SLR and slump test.              Precautions:       Manuals 3/26            STM L paraspinals                                                    Neuro Re-Ed             bridges             clamshelangelica             sidesteps                                                                 Ther Ex             ltr 2x10            tra 2x10            ppt             Piriformis s                                                                  Ther Activity                                       Gait Training                                       Modalities

## 2025-04-03 ENCOUNTER — OFFICE VISIT (OUTPATIENT)
Dept: PHYSICAL THERAPY | Facility: REHABILITATION | Age: 45
End: 2025-04-03
Payer: COMMERCIAL

## 2025-04-03 DIAGNOSIS — M54.50 ACUTE LEFT-SIDED LOW BACK PAIN, UNSPECIFIED WHETHER SCIATICA PRESENT: Primary | ICD-10-CM

## 2025-04-03 PROCEDURE — 97112 NEUROMUSCULAR REEDUCATION: CPT

## 2025-04-03 PROCEDURE — 97110 THERAPEUTIC EXERCISES: CPT

## 2025-04-03 PROCEDURE — 97140 MANUAL THERAPY 1/> REGIONS: CPT

## 2025-04-03 NOTE — PROGRESS NOTES
Daily Note     Today's date: 4/3/2025  Patient name: Marcelo Duran  : 1980  MRN: [id number]  Referring provider: Brendon Bunn, PABLO  Dx:   Encounter Diagnosis     ICD-10-CM    1. Acute left-sided low back pain, unspecified whether sciatica present  M54.50           Start Time: 1400  Stop Time: 1445  Total time in clinic (min): 45 minutes    Subjective: Patient reports her back is feeling much better. She reports compliance with HEP and relief with all exercises.       Objective: See treatment diary below      Assessment: Tolerated treatment well. Patient demonstrated fatigue post treatment, exhibited good technique with therapeutic exercises, and would benefit from continued PT Initiated POC this session with good tolerance, no pain noted, fatigues quickly with most TE. Initial cues required for proper breathing and form with TA, able to self correct with cues. Patient given updated HEP today reporting understanding.       Plan: Continue per plan of care.  Progress treatment as tolerated.       Precautions:       Manuals 3/26 4/3           STM L paraspinals  Done + piriformis                                                  Neuro Re-Ed             bridges  2x10x5''           clamshells  2x10 b/l           sidesteps                                                                 Ther Ex             ltr 2x10 2x10x5''           tra 2x10 2x10x5''           ppt             Piriformis s  10''x5 b/l           Ball rolls  Flexion 2x10x5''                                                  Ther Activity                                       Gait Training                                       Modalities

## 2025-04-07 ENCOUNTER — OFFICE VISIT (OUTPATIENT)
Dept: PHYSICAL THERAPY | Facility: REHABILITATION | Age: 45
End: 2025-04-07
Payer: COMMERCIAL

## 2025-04-07 DIAGNOSIS — M54.50 ACUTE LEFT-SIDED LOW BACK PAIN, UNSPECIFIED WHETHER SCIATICA PRESENT: Primary | ICD-10-CM

## 2025-04-07 PROCEDURE — 97140 MANUAL THERAPY 1/> REGIONS: CPT | Performed by: PHYSICAL THERAPIST

## 2025-04-07 PROCEDURE — 97112 NEUROMUSCULAR REEDUCATION: CPT | Performed by: PHYSICAL THERAPIST

## 2025-04-07 PROCEDURE — 97110 THERAPEUTIC EXERCISES: CPT | Performed by: PHYSICAL THERAPIST

## 2025-04-07 NOTE — PROGRESS NOTES
"Daily Note     Today's date: 2025  Patient name: Marcelo Duran  : 1980  MRN: [id number]  Referring provider: Brendon Bunn PT  Dx:   Encounter Diagnosis     ICD-10-CM    1. Acute left-sided low back pain, unspecified whether sciatica present  M54.50                      Subjective: patient states she is just about painfree      Objective: See treatment diary below      Assessment: Tolerated treatment well. Patient demonstrated fatigue post treatment, exhibited good technique with therapeutic exercises, and would benefit from continued PT Patient with full lumbar ROM. Patient will continue exercises at home and check in if needed      Plan: Continue per plan of care.      Precautions:       Manuals 3/26 4/3 4/7          STM L paraspinals  Done + piriformis done                                                 Neuro Re-Ed             bridges  2x10x5'' x10          clamshells  2x10 b/l           sidesteps             Pallof press   x10                                                 Ther Ex             ltr 2x10 2x10x5'' 5\"x10          tra 2x10 2x10x5''           ppt             Piriformis s  10''x5 b/l 10sx5          Ball rolls  Flexion 2x10x5''                                                  Ther Activity                                       Gait Training                                       Modalities                                              "

## 2025-04-15 ENCOUNTER — OFFICE VISIT (OUTPATIENT)
Dept: FAMILY MEDICINE CLINIC | Facility: CLINIC | Age: 45
End: 2025-04-15
Attending: PHYSICAL THERAPIST
Payer: COMMERCIAL

## 2025-04-15 VITALS
HEIGHT: 66 IN | TEMPERATURE: 97.4 F | HEART RATE: 68 BPM | OXYGEN SATURATION: 100 % | WEIGHT: 176.4 LBS | DIASTOLIC BLOOD PRESSURE: 74 MMHG | BODY MASS INDEX: 28.35 KG/M2 | SYSTOLIC BLOOD PRESSURE: 116 MMHG

## 2025-04-15 DIAGNOSIS — N92.1 MENORRHAGIA WITH IRREGULAR CYCLE: ICD-10-CM

## 2025-04-15 DIAGNOSIS — Z23 ENCOUNTER FOR IMMUNIZATION: ICD-10-CM

## 2025-04-15 DIAGNOSIS — Z13.220 LIPID SCREENING: ICD-10-CM

## 2025-04-15 DIAGNOSIS — Z00.00 WELL ADULT EXAM: Primary | ICD-10-CM

## 2025-04-15 DIAGNOSIS — M54.42 ACUTE LEFT-SIDED LOW BACK PAIN WITH LEFT-SIDED SCIATICA: ICD-10-CM

## 2025-04-15 DIAGNOSIS — Z11.59 NEED FOR HEPATITIS C SCREENING TEST: ICD-10-CM

## 2025-04-15 DIAGNOSIS — Z11.4 SCREENING FOR HIV (HUMAN IMMUNODEFICIENCY VIRUS): ICD-10-CM

## 2025-04-15 PROCEDURE — 99204 OFFICE O/P NEW MOD 45 MIN: CPT | Performed by: FAMILY MEDICINE

## 2025-04-15 PROCEDURE — 90715 TDAP VACCINE 7 YRS/> IM: CPT | Performed by: FAMILY MEDICINE

## 2025-04-15 PROCEDURE — 99386 PREV VISIT NEW AGE 40-64: CPT | Performed by: FAMILY MEDICINE

## 2025-04-15 PROCEDURE — 90471 IMMUNIZATION ADMIN: CPT | Performed by: FAMILY MEDICINE

## 2025-04-15 RX ORDER — NAPROXEN 500 MG/1
500 TABLET ORAL 2 TIMES DAILY PRN
Qty: 20 TABLET | Refills: 0 | Status: SHIPPED | OUTPATIENT
Start: 2025-04-15

## 2025-04-15 NOTE — PROGRESS NOTES
Adult Annual Physical  Name: Marcelo Duran      : 1980      MRN: [id number]  Encounter Provider: La Jones DO  Encounter Date: 4/15/2025   Encounter department: [facility]       Assessment/Plan:     Assessment & Plan  Well adult exam  See below        Acute left-sided low back pain with left-sided sciatica  Recurrent low back pain, failed PT   Update MRI to r/o worsening disc herniation   Refer as appropriate   Start naprosyn 500 mg twice a day with food   Continue muscle relaxer before bed if needed or if helps   Continue ice and stretching   Orders:  •  Ambulatory Referral to Family Practice  •  naproxen (NAPROSYN) 500 mg tablet; Take 1 tablet (500 mg total) by mouth 2 (two) times a day as needed for mild pain  •  MRI lumbar spine wo contrast; Future    Menorrhagia with irregular cycle  Update labs   Orders:  •  Comprehensive metabolic panel; Future  •  Lipid panel; Future  •  Iron Panel (Includes Ferritin, Iron Sat%, Iron, and TIBC); Future    Need for hepatitis C screening test  Update labs   Orders:  •  Hepatitis C Antibody; Future    Screening for HIV (human immunodeficiency virus)  Update labs   Orders:  •  HIV 1/2 AG/AB w Reflex [facility] for 2 yr old and above; Future    Encounter for immunization    Orders:  •  TDAP VACCINE GREATER THAN OR EQUAL TO 8YO IM    Lipid screening    Orders:  •  Lipid panel; Future            Well adult exam  ·         Continue healthy diet   ·         Encourage exercise 4 times a week or more for minimum 30 minutes.   ·         Continue to see dentist, wear seatbelt.  ·         Health maintenance reviewed - Tdap today, old records requested. Update fasting blood work - resuls via [facility] or call.   Reviewed age appropriate health maintenance screenings and immunizations that are due, risks and benefits of these.   Health Maintenance   Topic Date Due   • Hepatitis C Screening  Never done   • HIV Screening  Never done   • Annual Physical   Never done   • PT PLAN OF CARE  05/31/2023   • Influenza Vaccine (1) 06/30/2025 (Originally 9/1/2024)   • COVID-19 Vaccine (1 - 2024-25 season) 07/15/2025 (Originally 9/1/2024)   • Breast Cancer Screening: Mammogram  09/12/2025   • Depression Screening  04/15/2026   • Cervical Cancer Screening  08/14/2029   • Zoster Vaccine (1 of 2) 05/04/2032   • DTaP,Tdap,and Td Vaccines (2 - Td or Tdap) 04/15/2035   • Meningococcal B Vaccine  Aged Out   • RSV Vaccine age 0-20 Months  Aged Out   • Pneumococcal Vaccine: Pediatrics (0 to 5 Years) and At-Risk Patients (6 to 64 Years)  Aged Out   • HIB Vaccine  Aged Out   • IPV Vaccine  Aged Out   • Hepatitis A Vaccine  Aged Out   • Meningococcal ACWY Vaccine  Aged Out   • HPV Vaccine  Aged Out     Preventive Screenings:  - Diabetes Screening: screening up-to-date  - Cervical cancer screening: screening up-to-date   - Breast cancer screening: screening up-to-date   - Lung cancer screening: screening not indicated     Return in about 1 year (around 4/15/2026) for Annual physical.    Subjective:    AMISHA Robertson is a 44 y.o. female who presents today for a physical.     Chief Complaint   Patient presents with   • New Patient Visit     Est care. Pt would like to make  aware of pain she has in her back due to muscle sprain. Pills she is on currently not working.       Patient Care Team:  La Jones DO as PCP - General (Family Medicine)    ---Above per clinical staff & reviewed. ---  History of Present Illness     Patient here today for a physical:  Adult Annual Physical:  Patient presents for annual physical.     Depression Screening:  - PHQ-2 Score: 0      Diet: healthy diet   Exercise:  2 times in a week, stretching, home weights   Dental visits:  due   Sleep: 5 - 9 hours, varies, good sleep    Concerns today:  New pt - no old records from pcp   Shared medical records reviewed   Pt was seen in ER 9/26/24 and 3/20/25 for back pain   CT scan no stones     Left low back pain  "  First time urinary frequency   This time when she sat up locked and severe pain.   Difficult to move duet o pain   Rest helps.,  Streching helps some  If standing too much helps  Leaning forward helps   10/10 pain, 9/10   No radiation now, left leg went down and numbness   Tried muscle relaxer as needed x 2   Motrin one time  PT now complete   Did not help for long       UA positive leuks, blood, repeat 3/20/25 normal   Has been going to PT 3/26 x 3 sessions so far - sciatica   Daughter 20, son 8   Single.  at [employer].   Periods last 7 days, first 2 days, bleeds a lot in days.   Lightheaded         The following portions of the patient's history were reviewed and updated as appropriate: allergies, current medications, past family history, past medical history, past social history, past surgical history and problem list.     Current Medications:  Current Outpatient Medications   Medication Sig Dispense Refill   • cyclobenzaprine (FLEXERIL) 10 mg tablet Take 1 tablet (10 mg total) by mouth 3 (three) times a day as needed for muscle spasms 20 tablet 0   • ibuprofen (MOTRIN) 600 mg tablet Take 1 tablet (600 mg total) by mouth every 8 (eight) hours as needed for moderate pain 15 tablet 0   • naproxen (NAPROSYN) 500 mg tablet Take 1 tablet (500 mg total) by mouth 2 (two) times a day as needed for mild pain 20 tablet 0   • Tranexamic Acid 650 MG TABS Take 1,300 mg by mouth 2 (two) times a day For 5 days     • cyclobenzaprine (FLEXERIL) 10 mg tablet Take 1 tablet (10 mg total) by mouth 3 (three) times a day as needed for muscle spasms (Patient not taking: Reported on 4/15/2025) 15 tablet 0     No current facility-administered medications for this visit.        Objective:      /74 (Patient Position: Sitting, Cuff Size: Standard)   Pulse 68   Temp (!) 97.4 °F (36.3 °C) (Temporal)   Ht 5' 6\" (1.676 m)   Wt 80 kg (176 lb 6.4 oz)   SpO2 100%   BMI 28.47 kg/m²     BP Readings from Last 3 Encounters: "   04/15/25 116/74   03/20/25 132/74   12/09/24 116/70     Wt Readings from Last 3 Encounters:   04/15/25 80 kg (176 lb 6.4 oz)   12/09/24 79.8 kg (176 lb)   09/12/24 77.5 kg (170 lb 13.7 oz)       PHQ-2/9 Depression Screening    Little interest or pleasure in doing things: 0 - not at all  Feeling down, depressed, or hopeless: 0 - not at all  PHQ-2 Score: 0  PHQ-2 Interpretation: Negative depression screen            Physical Exam  Musculoskeletal:      Lumbar back: Spasms and tenderness present. No swelling, edema, deformity or lacerations. Decreased range of motion. Negative right straight leg raise test and negative left straight leg raise test.        Back:         Constitutional: she appears well-developed and well-nourished.   HENT: Head: Normocephalic.   Right Ear: External ear normal. Tympanic membrane normal.   Left Ear: External ear normal. Tympanic membrane normal.   Nose: Nose normal. No mucosal edema, No rhinorrhea.   Right sinus exhibits no maxillary sinus tenderness.   Left sinus exhibits no maxillary sinus tenderness.   Mouth/Throat: Oropharynx is clear and moist.   Eyes: Normal conjunctiva.  No erythema. No discharge.  Neck: No pain on exam. Neck supple.   Cardiovascular: Normal rate, regular rhythm and normal heart sounds.    Pulmonary/Chest: Effort normal and breath sounds normal. No wheezes. No rales. No rhonchi.   Abdominal: Soft. Bowel sounds are normal. There is no tenderness.   Musculoskeletal: she exhibits no edema.   Lymphadenopathy: she has no cervical adenopathy.   Neurological: she  is alert and oriented to person, place, and time.   Skin: Skin is warm and dry. No rashes.  Psychiatric: she  has a normal mood and affect. her behavior is normal. Thought content normal.   Vitals reviewed.            Review of Systems  ROS:  all others negative - no chest pain, SOB, normal urine and bowels. no GERD. sleeping well. mood good.         Objective   /74 (Patient Position: Sitting, Cuff Size:  "Standard)   Pulse 68   Temp (!) 97.4 °F (36.3 °C) (Temporal)   Ht 5' 6\" (1.676 m)   Wt 80 kg (176 lb 6.4 oz)   SpO2 100%   BMI 28.47 kg/m²               "

## 2025-04-17 ENCOUNTER — APPOINTMENT (OUTPATIENT)
Dept: LAB | Facility: CLINIC | Age: 45
End: 2025-04-17
Attending: FAMILY MEDICINE
Payer: COMMERCIAL

## 2025-04-17 DIAGNOSIS — Z11.4 SCREENING FOR HIV (HUMAN IMMUNODEFICIENCY VIRUS): ICD-10-CM

## 2025-04-17 DIAGNOSIS — N92.1 MENORRHAGIA WITH IRREGULAR CYCLE: ICD-10-CM

## 2025-04-17 DIAGNOSIS — Z11.59 NEED FOR HEPATITIS C SCREENING TEST: ICD-10-CM

## 2025-04-17 DIAGNOSIS — Z13.220 LIPID SCREENING: ICD-10-CM

## 2025-04-17 LAB
ALBUMIN SERPL BCG-MCNC: 4.1 G/DL (ref 3.5–5)
ALP SERPL-CCNC: 66 U/L (ref 34–104)
ALT SERPL W P-5'-P-CCNC: 12 U/L (ref 7–52)
ANION GAP SERPL CALCULATED.3IONS-SCNC: 6 MMOL/L (ref 4–13)
AST SERPL W P-5'-P-CCNC: 14 U/L (ref 13–39)
BILIRUB SERPL-MCNC: 0.38 MG/DL (ref 0.2–1)
BUN SERPL-MCNC: 10 MG/DL (ref 5–25)
CALCIUM SERPL-MCNC: 9 MG/DL (ref 8.4–10.2)
CHLORIDE SERPL-SCNC: 103 MMOL/L (ref 96–108)
CHOLEST SERPL-MCNC: 153 MG/DL (ref ?–200)
CO2 SERPL-SCNC: 27 MMOL/L (ref 21–32)
CREAT SERPL-MCNC: 0.61 MG/DL (ref 0.6–1.3)
FERRITIN SERPL-MCNC: 11 NG/ML (ref 30–307)
GFR SERPL CREATININE-BSD FRML MDRD: 112 ML/MIN/1.73SQ M
GLUCOSE P FAST SERPL-MCNC: 82 MG/DL (ref 65–99)
HDLC SERPL-MCNC: 62 MG/DL
IRON SATN MFR SERPL: 22 % (ref 15–50)
IRON SERPL-MCNC: 99 UG/DL (ref 50–212)
LDLC SERPL CALC-MCNC: 83 MG/DL (ref 0–100)
NONHDLC SERPL-MCNC: 91 MG/DL
POTASSIUM SERPL-SCNC: 3.7 MMOL/L (ref 3.5–5.3)
PROT SERPL-MCNC: 7.2 G/DL (ref 6.4–8.4)
SODIUM SERPL-SCNC: 136 MMOL/L (ref 135–147)
TIBC SERPL-MCNC: 459.2 UG/DL (ref 250–450)
TRANSFERRIN SERPL-MCNC: 328 MG/DL (ref 203–362)
TRIGL SERPL-MCNC: 38 MG/DL (ref ?–150)
UIBC SERPL-MCNC: 360 UG/DL (ref 155–355)

## 2025-04-17 PROCEDURE — 80061 LIPID PANEL: CPT

## 2025-04-17 PROCEDURE — 86803 HEPATITIS C AB TEST: CPT

## 2025-04-17 PROCEDURE — 80053 COMPREHEN METABOLIC PANEL: CPT

## 2025-04-17 PROCEDURE — 83550 IRON BINDING TEST: CPT

## 2025-04-17 PROCEDURE — 36415 COLL VENOUS BLD VENIPUNCTURE: CPT

## 2025-04-17 PROCEDURE — 82728 ASSAY OF FERRITIN: CPT

## 2025-04-17 PROCEDURE — 83540 ASSAY OF IRON: CPT

## 2025-04-17 PROCEDURE — 87389 HIV-1 AG W/HIV-1&-2 AB AG IA: CPT

## 2025-04-18 LAB
HCV AB SER QL: NORMAL
HIV 1+2 AB+HIV1 P24 AG SERPL QL IA: NORMAL

## 2025-04-23 ENCOUNTER — RESULTS FOLLOW-UP (OUTPATIENT)
Dept: FAMILY MEDICINE CLINIC | Facility: CLINIC | Age: 45
End: 2025-04-23

## 2025-04-23 DIAGNOSIS — M43.16 ANTEROLISTHESIS OF LUMBAR SPINE: ICD-10-CM

## 2025-04-23 DIAGNOSIS — R79.0 LOW FERRITIN: Primary | ICD-10-CM

## 2025-04-23 DIAGNOSIS — M54.42 ACUTE LEFT-SIDED LOW BACK PAIN WITH LEFT-SIDED SCIATICA: ICD-10-CM

## 2025-04-23 DIAGNOSIS — M51.26 PROTRUDED LUMBAR DISC: ICD-10-CM

## 2025-04-23 RX ORDER — FERROUS SULFATE 325(65) MG
325 TABLET ORAL DAILY
Start: 2025-04-23 | End: 2025-06-09

## 2025-04-23 NOTE — RESULT ENCOUNTER NOTE
Call patient with blood work results.  Her ferritin level is low at 11.  I would like her to start over-the-counter ferrous sulfate 325 mg daily.  This may cause constipation so she may want to add a fiber supplement or stool softener.  We will repeat this blood test in about 8 weeks.  If she is not tolerating the iron she should let me know.  The rest of her blood work looks good

## 2025-05-03 ENCOUNTER — HOSPITAL ENCOUNTER (OUTPATIENT)
Dept: MRI IMAGING | Facility: HOSPITAL | Age: 45
Discharge: HOME | End: 2025-05-03
Attending: FAMILY MEDICINE
Payer: COMMERCIAL

## 2025-05-03 DIAGNOSIS — M54.42 ACUTE LEFT-SIDED LOW BACK PAIN WITH LEFT-SIDED SCIATICA: ICD-10-CM

## 2025-05-03 PROCEDURE — 72148 MRI LUMBAR SPINE W/O DYE: CPT

## 2025-05-05 NOTE — RESULT ENCOUNTER NOTE
Call pt- MRI lumbar spine shows mild increased disc protrusion and some other changes. Nothing worrisome, but I am going to place a referral to pain mgmt to try to help her get some relief.

## 2025-06-09 ENCOUNTER — OFFICE VISIT (OUTPATIENT)
Dept: PAIN MEDICINE | Facility: CLINIC | Age: 45
End: 2025-06-09
Payer: COMMERCIAL

## 2025-06-09 VITALS
HEIGHT: 66 IN | HEART RATE: 74 BPM | BODY MASS INDEX: 28.12 KG/M2 | SYSTOLIC BLOOD PRESSURE: 108 MMHG | WEIGHT: 175 LBS | DIASTOLIC BLOOD PRESSURE: 77 MMHG

## 2025-06-09 DIAGNOSIS — M54.16 LUMBAR RADICULOPATHY: Primary | ICD-10-CM

## 2025-06-09 PROCEDURE — 99214 OFFICE O/P EST MOD 30 MIN: CPT | Performed by: ANESTHESIOLOGY

## 2025-06-09 RX ORDER — METHYLPREDNISOLONE 4 MG/1
TABLET ORAL
Qty: 21 TABLET | Refills: 0 | Status: SHIPPED | OUTPATIENT
Start: 2025-06-09

## 2025-06-09 RX ORDER — GABAPENTIN 300 MG/1
300 CAPSULE ORAL
Qty: 30 CAPSULE | Refills: 1 | Status: SHIPPED | OUTPATIENT
Start: 2025-06-09 | End: 2025-07-09

## 2025-06-09 NOTE — PROGRESS NOTES
Name: Marcelo Duran      : 1980      MRN: [id number]  Encounter Provider: Brad Coelho MD  Encounter Date: 2025   Encounter department: [facility]  :  Assessment & Plan  Lumbar radiculopathy    Orders:  •  methylPREDNISolone 4 MG tablet therapy pack; Use as directed on package  •  gabapentin (NEURONTIN) 300 mg capsule; Take 1 capsule (300 mg total) by mouth daily at bedtime    The patient's symptoms, history/physical are consistent with a left-sided lumbar radiculopathy secondary to the L3-4 and L5-6 disc protrusions.  At this time, advised that I will start her on a Medrol Dosepak to help reduce swelling/inflammation and also placed on gabapentin 300 mg at bedtime to help with radicular symptoms.  She was asked to call with an update next week and how she is doing.  If she is still symptomatic, I discussed proceeding with an L3 LESI to help reduce symptoms.  In addition, advised that we will complete [place] paperwork for her to cover her for when she is having a flareup and needs to miss work.      History of Present Illness     Marcelo Duran is a 45 y.o. female who presents for a follow up office visit in regards to Back Pain (Back pain - MRI Lumbar done 5/3/2025) and Leg Pain (Left leg pain).  The patient reports that she awoke with pain in mid March on the left side with numbness and tingling going down the left leg.  She was evaluated in the emergency room and was diagnosed with acute on chronic low back pain.  She subsequently saw her PCP and was started on physical therapy.  She was placed on cyclobenzaprine and naproxen which were helpful.  She reports ongoing symptoms in the left low back that travels on the left leg with numbness and tingling with some weakness.  Not currently taking any medications.      Review of Systems   Respiratory:  Negative for shortness of breath.    Cardiovascular:  Negative for chest pain.   Gastrointestinal:  Negative for constipation,  "diarrhea, nausea and vomiting.   Musculoskeletal:  Positive for back pain. Negative for arthralgias, gait problem, joint swelling and myalgias.   Skin:  Negative for rash.   Neurological:  Positive for numbness (left leg). Negative for dizziness, seizures and weakness.   All other systems reviewed and are negative.      Medical History Reviewed by provider this encounter:  Tobacco  Allergies  Meds  Problems  Med Hx  Surg Hx  Fam Hx     .  Medications Ordered Prior to Encounter[1]   Social History[2]     Objective   /77 (BP Location: Right arm, Patient Position: Sitting, Cuff Size: Standard)   Pulse 74   Ht 5' 6\" (1.676 m)   Wt 79.4 kg (175 lb)   BMI 28.25 kg/m²      Pain Score:   9  Physical Exam  Constitutional: normal, well developed, well nourished, alert, in no distress and non-toxic and no overt pain behavior.  Eyes: anicteric  HEENT: grossly intact  Neck: supple, symmetric, trachea midline and no masses   Pulmonary: even and unlabored  Cardiovascular: No edema or pitting edema present  Skin: Normal without rashes or lesions and well hydrated  Psychiatric: Mood and affect appropriate  Neurologic: Cranial Nerves II-XII grossly intact  Musculoskeletal: normal    Lumbar Spine Exam  Appearance:  Normal lordosis  Palpation/Tenderness:  left lumbar paraspinal tenderness  Range of Motion:  Flexion:  Moderately limited  with pain  Extension:  Moderately limited  with pain  Motor Strength:  Left hip flexion:  5/5  Left hip extension:  5/5  Right hip flexion:  5/5  Right hip extension:  5/5  Left knee flexion:  5/5  Left knee extension:  5/5  Right knee flexion:  5/5  Right knee extension:  5/5  Left foot dorsiflexion:  5/5  Left foot plantar flexion:  5/5  Right foot dorsiflexion:  5/5  Right foot plantar flexion:  5/5    Imaging    MRI LUMBAR SPINE WITHOUT CONTRAST (5/3/2025)     INDICATION: M54.42: Lumbago with sciatica, left side. Acute lower back pain with left-sided sciatica.     COMPARISON: CT " renal stone 09/26/2024, MRI lumbar spine 08/07/2021     TECHNIQUE:  Multiplanar, multisequence imaging of the lumbar spine was performed. .  Imaging performed on 1.5T MRI     IMAGE QUALITY:  Diagnostic     FINDINGS:     VERTEBRAL BODIES: Transitional lumbosacral anatomy is noted with 6 lumbar type vertebral bodies. The caudal-most rib-bearing vertebral body has been labeled as T12. The L6 segment appears to be sacralized on the left. The first conical shaped sacral   segment has been labeled as S1. Please note that the numbering system is different than the prior MR lumbar spine study report of August 7, 2021.     Grade 1 anterolisthesis of L5 on L6, new from the prior study. No spondylolysis. No scoliosis.  No compression fracture.  There is marrow edema within the L5 and L6 articular pillars with mild perifacet soft tissue edema. There is also edema in the left   more than right L5 and L6 pedicles.     SACRUM:  Normal signal within the sacrum. No evidence of insufficiency or stress fracture.     DISTAL CORD AND CONUS:  Normal size and signal within the distal cord and conus. Conus terminates at L2.     PARASPINAL SOFT TISSUES:  Paraspinal soft tissues are unremarkable.     LOWER THORACIC DISC SPACES:  Normal disc height and signal.  No disc herniation, canal stenosis or foraminal narrowing.     LUMBAR DISC SPACES: Rudimentary intervertebral disc at the S1-S2 level.     L1-L2: Axial T2 disc shot excluded. Normal appearance on remaining sequences.     L2-L3:  Normal.     L3-L4: Redemonstrated broad-based left foraminal and extraforaminal disc protrusion, mildly increased in size from prior. The protrusion is abutting the exited left L3 nerve root, correlate for ipsilateral radiculitis. Mild left facet arthropathy. No   spinal stenosis.     L4-L5: Normal disc height and signal. No canal or foraminal stenosis. Mild facet arthropathy, similar to slightly increased from prior.     L5-L6: Mild disc bulging and small left  foraminal and extraforaminal disc protrusion, similar to prior. No significant canal or foraminal stenosis. Progressive facet arthropathy. Active facet arthropathy at this level, more so on the left than the right.     OTHER FINDINGS: Myomatous uterus.      Radiology Results Review: I personally reviewed the following image studies in PACS and associated radiology reports: MRI spine. My interpretation of the radiology images/reports is: Disc protrusions at the left L3-4 and L5-6 with spondylolisthesis at L4-5.             [1]  Current Outpatient Medications on File Prior to Visit   Medication Sig Dispense Refill   • [DISCONTINUED] cyclobenzaprine (FLEXERIL) 10 mg tablet Take 1 tablet (10 mg total) by mouth 3 (three) times a day as needed for muscle spasms (Patient not taking: Reported on 4/15/2025) 15 tablet 0   • [DISCONTINUED] cyclobenzaprine (FLEXERIL) 10 mg tablet Take 1 tablet (10 mg total) by mouth 3 (three) times a day as needed for muscle spasms 20 tablet 0   • [DISCONTINUED] ferrous sulfate 325 (65 Fe) mg tablet Take 1 tablet (325 mg total) by mouth daily OTC. Take a with meal & vitamin C. (Patient not taking: Reported on 6/9/2025)     • [DISCONTINUED] ibuprofen (MOTRIN) 600 mg tablet Take 1 tablet (600 mg total) by mouth every 8 (eight) hours as needed for moderate pain (Patient not taking: Reported on 6/9/2025) 15 tablet 0   • [DISCONTINUED] naproxen (NAPROSYN) 500 mg tablet Take 1 tablet (500 mg total) by mouth 2 (two) times a day as needed for mild pain (Patient not taking: Reported on 6/9/2025) 20 tablet 0   • [DISCONTINUED] Tranexamic Acid 650 MG TABS Take 1,300 mg by mouth 2 (two) times a day For 5 days (Patient not taking: Reported on 6/9/2025)       No current facility-administered medications on file prior to visit.   [2]  Social History  Tobacco Use   • Smoking status: Never     Passive exposure: Never   • Smokeless tobacco: Never   Vaping Use   • Vaping status: Never Used   Substance and Sexual  Activity   • Alcohol use: Yes     Comment: socailly    • Drug use: Never   • Sexual activity: Not Currently     Partners: Male

## 2025-06-09 NOTE — ASSESSMENT & PLAN NOTE
Orders:  •  methylPREDNISolone 4 MG tablet therapy pack; Use as directed on package  •  gabapentin (NEURONTIN) 300 mg capsule; Take 1 capsule (300 mg total) by mouth daily at bedtime    The patient's symptoms, history/physical are consistent with a left-sided lumbar radiculopathy secondary to the L3-4 and L5-6 disc protrusions.  At this time, advised that I will start her on a Medrol Dosepak to help reduce swelling/inflammation and also placed on gabapentin 300 mg at bedtime to help with radicular symptoms.  She was asked to call with an update next week and how she is doing.  If she is still symptomatic, I discussed proceeding with an L3 LESI to help reduce symptoms.  In addition, advised that we will complete [place] paperwork for her to cover her for when she is having a flareup and needs to miss work.

## 2025-08-11 ENCOUNTER — OFFICE VISIT (OUTPATIENT)
Dept: PAIN MEDICINE | Facility: CLINIC | Age: 45
End: 2025-08-11
Payer: COMMERCIAL